# Patient Record
Sex: MALE | Race: WHITE | NOT HISPANIC OR LATINO | Employment: OTHER | URBAN - METROPOLITAN AREA
[De-identification: names, ages, dates, MRNs, and addresses within clinical notes are randomized per-mention and may not be internally consistent; named-entity substitution may affect disease eponyms.]

---

## 2017-01-13 ENCOUNTER — OFFICE VISIT (OUTPATIENT)
Dept: OTOLARYNGOLOGY | Facility: CLINIC | Age: 37
End: 2017-01-13
Payer: COMMERCIAL

## 2017-01-13 ENCOUNTER — CLINICAL SUPPORT (OUTPATIENT)
Dept: AUDIOLOGY | Facility: CLINIC | Age: 37
End: 2017-01-13
Payer: COMMERCIAL

## 2017-01-13 VITALS — WEIGHT: 247.38 LBS | BODY MASS INDEX: 30.76 KG/M2 | HEIGHT: 75 IN

## 2017-01-13 DIAGNOSIS — H91.22 SUDDEN HEARING LOSS, LEFT: Primary | ICD-10-CM

## 2017-01-13 DIAGNOSIS — H90.42 SENSORINEURAL HEARING LOSS OF LEFT EAR WITH UNRESTRICTED HEARING OF CONTRALATERAL EAR: ICD-10-CM

## 2017-01-13 PROBLEM — H90.5 SENSORINEURAL HEARING LOSS OF LEFT EAR: Status: ACTIVE | Noted: 2017-01-13

## 2017-01-13 PROCEDURE — 99999 PR PBB SHADOW E&M-EST. PATIENT-LVL II: CPT | Mod: PBBFAC,,, | Performed by: OTOLARYNGOLOGY

## 2017-01-13 PROCEDURE — 92557 COMPREHENSIVE HEARING TEST: CPT | Mod: S$GLB,,, | Performed by: AUDIOLOGIST

## 2017-01-13 PROCEDURE — 1159F MED LIST DOCD IN RCRD: CPT | Mod: S$GLB,,, | Performed by: OTOLARYNGOLOGY

## 2017-01-13 PROCEDURE — 99204 OFFICE O/P NEW MOD 45 MIN: CPT | Mod: S$GLB,,, | Performed by: OTOLARYNGOLOGY

## 2017-01-13 PROCEDURE — 92550 TYMPANOMETRY & REFLEX THRESH: CPT | Mod: S$GLB,,, | Performed by: AUDIOLOGIST
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
## 2017-01-13 NOTE — LETTER
January 13, 2017      Terell Tellez MD  [address]  [facility] [address]           [facility] - Otorhinolaryngology  [address]  [facility] [address]  Phone: [phone]  Fax: [phone]          Patient: Elliott Hernandez   MR Number: [id number]   YOB: 1980   Date of Visit: 1/13/2017       Dear Dr. Terell Tellez:    Thank you for referring Elliott Hernandez to me for evaluation. Attached you will find relevant portions of my assessment and plan of care.    If you have questions, please do not hesitate to call me. I look forward to following Elliott Hernandez along with you.    Sincerely,    Juan Presley MD    Enclosure  CC:  No Recipients    If you would like to receive this communication electronically, please contact [email or web address] or [phone] to request more information on Vid[employer] access.    For providers and/or their staff who would like to refer a patient to [facility], please contact us through our one-stop-shop provider referral line, [facility], at [phone].    If you feel you have received this communication in error or would no longer like to receive these types of communications, please e-mail [email or web address]

## 2017-01-13 NOTE — PROGRESS NOTES
Normal hearing with type A tympanogram and present reflexes in the right ear.  Normal to moderate hearing loss with type A tympanograms and present reflexes in the left ear.

## 2017-09-13 ENCOUNTER — NUTRITION (OUTPATIENT)
Dept: NUTRITION | Facility: CLINIC | Age: 37
End: 2017-09-13

## 2017-09-13 DIAGNOSIS — Z71.3 NUTRITIONAL COUNSELING: Primary | ICD-10-CM

## 2017-09-13 PROCEDURE — S9470 NUTRITIONAL COUNSELING, DIET: HCPCS | Mod: S$GLB,,, | Performed by: NUTRITIONIST

## 2017-09-13 NOTE — PROGRESS NOTES
Nutrition Assessment for Medical Nutrition Therapy    Referring professional: self for 12-week program    Reason for MNT visit: Pt in for education and nutrition counseling regarding: Healthy eating habits/weight loss     Past Medical History:  Active Ambulatory Problems     Diagnosis Date Noted    Sensorineural hearing loss of left ear 01/13/2017     Resolved Ambulatory Problems     Diagnosis Date Noted    No Resolved Ambulatory Problems     No Additional Past Medical History       Medications: none (advil prn)    InBody Body Composition stats:  Wt: 239.6#  SMM: 105.2#  %BF: 23.6  BMI: 30.7  Visceral: 9    Energy Requirements:  Calories: 2000  Protein: 191-240 grams  Carbohydrates: ~100-140 grams  Focus on Heart Healthy Fats  Fluid: ~120 oz = ~4, 32 oz bottle water + sweat loss    Recommended meal plan provided to client:  Breakfast: Up to 2 servings of carbs = 30-40 grams + at least 15 grams lean protein/heart healthy fat   1 cup cooked oatmeal +   o ¼ cup nuts  o 1 Tbsp PB stirred into oatmeal  o Iconic ready to drink protein shake    Snack: Up to 1 serving of carb = 15-20 grams + at least 15 grams lean protein/heart healthy fat   Not needed unless you are exceeding 4 hours between breakfast and lunch (see second snack for options)    Lunch: 6 oz lean protein + non-starchy veggies + 2 servings of carbs = 30-40 grams   Examples of 2 servings of carbs:  o 1 cup cooked pasta, 2/3 cup cooked rice, 1 medium baked/sweet potato, 2 regular slices pizza, 1 cup beans, 1 piece of fruit + 2 slices whole wheat bread/ ½ traci from Byblos  - The rest of your plate will consist of 6 oz lean protein + non-starchy veggies   Meal examples:   o Byblos: ½ traci + smear of hummus + 6 oz chicken/beef shawarma + side salad  o Your sushi choice is perfect  o Popeyes: Take the skin off, you are then eating grilled chicken + side of green beans + side of carb (either mashed potatoes OR Red beans and rice)  o When dining out, then at  most 1 cup cooked starchthe rest will be 6 oz lean protein + veggies  - Unless [place] [employer]: Choose 6-7 oz meat  (brisket, chicken, pork)    Snack: Up to 1.5 servings of carb = 15-30 grams + at least 15 grams lean protein/heart healthy fat   Grilled chicken sandwich or 1 hamburger from fast food guide list   Apple + 2 Tbsp PB   2 Iconic protein drinks   Carrots are fine, but can add protein (string cheese, deli meat slices, iconic, etc)   Flap Jacked mighty muffin pack (see link belowthese are found at [facility] and are very convenient)   2 servings of Beanitos (~20 chips) + salsa and/or ¼ cup shredded cheese melted on top   6 inch subway sandwich, take half the bread off   Piece of fruit + string cheese   Seaman from home: Take 2 slices 100% whole wheat bread + load the turkey/ham slices + slice of cheese + 1 Tbsp condiment of choice   1/3 cup trail mix    Dinner: NO carbs   Focus on 7 oz lean protein + non-starchy veggies  o Kabobs, stir carter, etc  o Byblos: chicken/beef shawarma + side of veggies (no hummus, no traci)  o Chipotle: Burrito in a Bowl (no rice, no beans)focus on double the protein and any non-starchy veggie you want  o Akbar: Fish + veggies OR EFN list  o Reginellis: If turkey sandwich, then take have the bread off (compromise)    Snack: (Optional): Anything UP  calories   1 light beer OR glass of wine   1 cup popcorn (not buttered)   fun size    Additional Notes:  Incorporate a source of lean protein, fiber, and heart healthy fat with each meal.   - These three nutrients take the longest to digest, so we feel full longer    Restaurant TipsPick 2 out of the 5:  1. Bread and butter/chips and salsa  2. Appetizer  3. Entrée  4. Dessert  5. Alcohol  Eating out in [place]:  1. www.eatfitnola.com  2. Fit BRANDON maged (Free maged for smartphones)  a. List of all Eat Fit restaurants  b. See what dishes are Eat Fit at each restaurant  c. See the nutrition facts for each Eat Fit  dish  d. Provides >200 Eat Fit recipes  3. Fast Food Lite Guide  Salad Fats: Pick 2, each being 2 Tbsp:  1. Dressing  2. Cheese  3. Nuts  4. Kessler  5. Avocado (1/4 okay)  6. Guacamole  7. Sour cream  8. Egg (1 okay)  -Flap Jacked might muffin: https://www.FashionQlub/collections/mighty-muffins  -Fried foods/cheat meals: For the first month do 1x/week at most. After month 1, drop down to 2x/month  -Better for you lower calorie drink options:   Fruitables   Kaylyn Sun Roarin Water   Crystal Lite PURE    Nutrition History        Food allergies  intolerances: NKFA    Dining out: Daily, 6 or more times per week    Smoking/alcohol: nonsmoker ; alcohol: beer and wine (~10 or so a week)  Beverages:   Coffee: Ice coffee with a splash of milk or almond milk (no sweeteners)  Water: Drinks water all day     Meal preparation/shopping: self        Recommendations/Interventions:  1. Aim for 7-9 hours sleep  2. Exercise 60 minutes most days  3. Eat breakfast within 1-2 hours of waking up  4. Try not to skip any meals or snacks, not going more than 3-4 hours without eating.   5. At each meal and snack, try to include a source of fiber + lean protein + healthy fat.       Comprehension: good     Motivation to change: moderate      Follow-up: 2 weeks    Counseling time: 1 Hour 30 Minutes

## 2017-09-27 ENCOUNTER — NUTRITION (OUTPATIENT)
Dept: NUTRITION | Facility: CLINIC | Age: 37
End: 2017-09-27

## 2017-09-27 DIAGNOSIS — Z71.3 NUTRITIONAL COUNSELING: Primary | ICD-10-CM

## 2017-09-27 PROCEDURE — 99499 UNLISTED E&M SERVICE: CPT | Mod: S$GLB,,, | Performed by: NUTRITIONIST

## 2017-09-27 NOTE — PROGRESS NOTES
Reason for MNT visit: %BF loss    Down about 2 pounds, gained a little muscle, and lost 0.6% body fat. He started snacking more often and bought some new products that we talked about. He had his birthday last week and was on the go a lot, so still did well with planning. Been doing okay with no carbs     The plan for vacation is to go running + spear fishing (some sort of exercise) since he will be drinking a good bit.     Recommendations: Continue following recommended meal plan    Comprehension: good     Motivation to change: moderate    Follow-up: 2.5 weeks    Counseling time: 30 Minutes

## 2017-10-19 ENCOUNTER — NUTRITION (OUTPATIENT)
Dept: NUTRITION | Facility: CLINIC | Age: 37
End: 2017-10-19

## 2017-10-19 DIAGNOSIS — Z71.3 NUTRITIONAL COUNSELING: Primary | ICD-10-CM

## 2017-10-19 PROCEDURE — 99499 UNLISTED E&M SERVICE: CPT | Mod: S$GLB,,, | Performed by: NUTRITIONIST

## 2017-10-19 NOTE — PROGRESS NOTES
Reason for MNT visit: weight loss.    Not as much exercise as he used He has been super busy so not always snacking as often as he would like to, particularly the afternoon snack around 2:30/3 pm. I recommended to make turkey/cheese or PB sandwiches ahead of time to pack with him. Also suggested beef jerky and trail mix to bring to work. Overall, he is down ~1% body fat.          Recommendations: Continue following meal plan while adding in quick/easy recommended snacks.     Comprehension: good     Motivation to change: moderate    Follow-up: 2 weeks    Counseling time: 30 Minutes

## 2017-11-02 ENCOUNTER — NUTRITION (OUTPATIENT)
Dept: NUTRITION | Facility: CLINIC | Age: 37
End: 2017-11-02

## 2017-11-02 DIAGNOSIS — Z71.3 NUTRITIONAL COUNSELING: Primary | ICD-10-CM

## 2017-11-02 PROCEDURE — 99499 UNLISTED E&M SERVICE: CPT | Mod: S$GLB,,, | Performed by: NUTRITIONIST

## 2017-11-02 NOTE — PROGRESS NOTES
Reason for MNT visit:     Wt: 241.1 lbs  SMM: 107.6 lbs  %BF: 22.6%  Visceral: 11  BMI: 30.9    The diet is okay--there are a few off days, but he keeps principles in mind. He would like to exercise more, but finding time is an issue. I recommended even doing a 10 minute run adds up. The goal now is to maintain/slowly lose %BF instead of putting a number to it. He is doing 1% BF in about 2 months, but we are both happy with this since he travels so mucha and has a lot of social events    Recommendations: Recommended to increase exercise on days where he knows he may drink/eat more.     Comprehension: good     Motivation to change: moderate    Follow-up: 2 weeks    Counseling time: 30 Minutes

## 2022-12-28 ENCOUNTER — TELEPHONE (OUTPATIENT)
Dept: RHEUMATOLOGY | Facility: CLINIC | Age: 42
End: 2022-12-28
Payer: COMMERCIAL

## 2022-12-28 ENCOUNTER — TELEPHONE (OUTPATIENT)
Dept: ORTHOPEDICS | Facility: CLINIC | Age: 42
End: 2022-12-28
Payer: COMMERCIAL

## 2022-12-28 DIAGNOSIS — R52 PAIN: Primary | ICD-10-CM

## 2022-12-28 NOTE — TELEPHONE ENCOUNTER
Spoke with the patient and let him know that we did not have any sooner appointments but to reach out to his podiatrist to order labs for his up coming appointment

## 2023-01-03 ENCOUNTER — OFFICE VISIT (OUTPATIENT)
Dept: RHEUMATOLOGY | Facility: CLINIC | Age: 43
End: 2023-01-03
Payer: COMMERCIAL

## 2023-01-03 VITALS
DIASTOLIC BLOOD PRESSURE: 84 MMHG | HEIGHT: 75 IN | HEART RATE: 74 BPM | WEIGHT: 248.25 LBS | BODY MASS INDEX: 30.87 KG/M2 | SYSTOLIC BLOOD PRESSURE: 122 MMHG

## 2023-01-03 DIAGNOSIS — M10.9 GOUTY ARTHRITIS: ICD-10-CM

## 2023-01-03 DIAGNOSIS — M25.50 POLYARTHRALGIA: Primary | ICD-10-CM

## 2023-01-03 DIAGNOSIS — R29.898 HAND WEAKNESS: ICD-10-CM

## 2023-01-03 PROCEDURE — 3008F PR BODY MASS INDEX (BMI) DOCUMENTED: ICD-10-PCS | Mod: CPTII,S$GLB,, | Performed by: INTERNAL MEDICINE

## 2023-01-03 PROCEDURE — 99205 PR OFFICE/OUTPT VISIT, NEW, LEVL V, 60-74 MIN: ICD-10-PCS | Mod: S$GLB,,, | Performed by: INTERNAL MEDICINE

## 2023-01-03 PROCEDURE — 99999 PR PBB SHADOW E&M-EST. PATIENT-LVL III: CPT | Mod: PBBFAC,,, | Performed by: INTERNAL MEDICINE

## 2023-01-03 PROCEDURE — 3074F SYST BP LT 130 MM HG: CPT | Mod: CPTII,S$GLB,, | Performed by: INTERNAL MEDICINE

## 2023-01-03 PROCEDURE — 99205 OFFICE O/P NEW HI 60 MIN: CPT | Mod: S$GLB,,, | Performed by: INTERNAL MEDICINE

## 2023-01-03 PROCEDURE — 3008F BODY MASS INDEX DOCD: CPT | Mod: CPTII,S$GLB,, | Performed by: INTERNAL MEDICINE

## 2023-01-03 PROCEDURE — 3079F PR MOST RECENT DIASTOLIC BLOOD PRESSURE 80-89 MM HG: ICD-10-PCS | Mod: CPTII,S$GLB,, | Performed by: INTERNAL MEDICINE

## 2023-01-03 PROCEDURE — 3079F DIAST BP 80-89 MM HG: CPT | Mod: CPTII,S$GLB,, | Performed by: INTERNAL MEDICINE

## 2023-01-03 PROCEDURE — 1159F PR MEDICATION LIST DOCUMENTED IN MEDICAL RECORD: ICD-10-PCS | Mod: CPTII,S$GLB,, | Performed by: INTERNAL MEDICINE

## 2023-01-03 PROCEDURE — 3074F PR MOST RECENT SYSTOLIC BLOOD PRESSURE < 130 MM HG: ICD-10-PCS | Mod: CPTII,S$GLB,, | Performed by: INTERNAL MEDICINE

## 2023-01-03 PROCEDURE — 1159F MED LIST DOCD IN RCRD: CPT | Mod: CPTII,S$GLB,, | Performed by: INTERNAL MEDICINE

## 2023-01-03 PROCEDURE — 99999 PR PBB SHADOW E&M-EST. PATIENT-LVL III: ICD-10-PCS | Mod: PBBFAC,,, | Performed by: INTERNAL MEDICINE

## 2023-01-03 RX ORDER — COLCHICINE 0.6 MG/1
0.6 TABLET ORAL DAILY
Qty: 30 TABLET | Refills: 11 | Status: SHIPPED | OUTPATIENT
Start: 2023-01-03 | End: 2023-02-27

## 2023-01-03 RX ORDER — ALLOPURINOL 100 MG/1
200 TABLET ORAL DAILY
Qty: 60 TABLET | Refills: 0 | Status: SHIPPED | OUTPATIENT
Start: 2023-01-03 | End: 2023-02-07

## 2023-01-03 RX ORDER — HYDROCODONE BITARTRATE AND ACETAMINOPHEN 7.5; 325 MG/1; MG/1
TABLET ORAL
COMMUNITY
End: 2023-02-27

## 2023-01-03 RX ORDER — PREDNISONE 5 MG/1
TABLET, DELAYED RELEASE ORAL
COMMUNITY
End: 2023-02-27

## 2023-01-03 NOTE — PROGRESS NOTES
Rapid3 Question Responses and Scores 1/2/2023   MDHAQ Score 1.3   Psychologic Score 1.1   Pain Score 7.5   When you awakened in the morning OVER THE LAST WEEK, did you feel stiff? Yes   If Yes, please indicate the number of hours until you are as limber as you will be for the day 3   Fatigue Score 5.5   Global Health Score 2.5   RAPID3 Score 4.78     Answers submitted by the patient for this visit:  Rheumatology Questionnaire (Submitted on 1/2/2023)  fever: No  eye redness: No  mouth sores: No  headaches: No  shortness of breath: No  chest pain: No  trouble swallowing: No  diarrhea: No  constipation: No  unexpected weight change: No  genital sore: No  During the last 3 days, have you had a skin rash?: No  Bruises or bleeds easily: No  cough: No

## 2023-01-03 NOTE — PROGRESS NOTES
"Subjective:       Patient ID: Elliott Hernandez is a 42 y.o. male.    Chief Complaint: Disease Management   HPI: 42 year old M  with presumed gout here to establish care. In 2018, he had episode of pain and swelling in left ankle.  He had steroid injection and it helped.  In 2019 and 2020,  it affected his left ankle. Most recently, he had pain and swelling in left knee. He had trouble walking or bending his knee.  He took anti inflammatory.    He reports medrol normally works him better.  Denies any rashes, oral ulcers,photosensitivity, or raynauds.   He drinks on occasion during vacations.  Family hx:  negative for psoriasis; gout: diet       Review of Systems   Constitutional:  Negative for activity change, appetite change, chills, diaphoresis, fatigue, fever and unexpected weight change.   HENT:  Negative for ear discharge, ear pain, hearing loss, mouth sores, nosebleeds, sinus pressure and trouble swallowing.    Eyes: Negative.  Negative for photophobia, pain, discharge, redness and itching.   Respiratory:  Negative for cough, chest tightness and shortness of breath.    Cardiovascular:  Negative for chest pain, palpitations and leg swelling.   Gastrointestinal:  Negative for abdominal distention, blood in stool, constipation, diarrhea and nausea.   Endocrine: Negative for cold intolerance, heat intolerance, polydipsia and polyphagia.   Genitourinary:  Negative for flank pain, genital sores and hematuria.   Musculoskeletal:  Positive for arthralgias and joint swelling. Negative for back pain, gait problem and neck pain.   Skin:  Negative for color change, pallor and rash.   Neurological:  Negative for dizziness, weakness, light-headedness and headaches.   Hematological:  Negative for adenopathy. Does not bruise/bleed easily.   Psychiatric/Behavioral:  Negative for decreased concentration and hallucinations. The patient is not nervous/anxious.          Objective:   /84   Pulse 74   Ht 6' 3" (1.905 " m)   Wt 112.6 kg (248 lb 3.8 oz)   BMI 31.03 kg/m²      Physical Exam   Constitutional: normal appearance.   HENT:   Head: Normocephalic and atraumatic.   Nose: Nose normal. No rhinorrhea or nasal congestion.   Mouth/Throat: Mucous membranes are dry. Oropharynx is clear.   Eyes: Pupils are equal, round, and reactive to light. Conjunctivae are normal. Right eye exhibits no discharge. Left eye exhibits no discharge. No scleral icterus.   Cardiovascular: Normal rate and regular rhythm. Exam reveals no gallop and no friction rub.   No murmur heard.  Pulmonary/Chest: No stridor. No respiratory distress. He has no wheezes.   Abdominal: Bowel sounds are normal. He exhibits no distension and no mass. There is no abdominal tenderness.   Musculoskeletal:         General: Tenderness present. Normal range of motion.   Neurological: He is alert.   Skin: No bruising noted. No jaundice or pallor.   Mild tenderness in left knee with extension      No data to display     Assessment:     42 year old M  with presumed gout here to establish care. He has episodes of left ankle swelling and left knee swelling.  He reports flares are usually in setting if eating meat and also alcohol.  He reports also weakness in his hands so will refer him to orthopedics.    No diagnosis found.        Plan:       Problem List Items Addressed This Visit    None    Start allopurinol 200mg po qday  Start colchicine 0.6 mg po qd ay  ( He will be going on vacation next week so he will start allopurinol when he gets back in town).  Labs and xrays in 4 weeks after he starts the allopurinol        60 * minutes of total time spent on the encounter, which includes face to face time and non-face to face time preparing to see the patient (eg, review of tests), Obtaining and/or reviewing separately obtained history, Documenting clinical information in the electronic or other health record, Independently interpreting results (not separately reported) and  communicating results to the patient/family/caregiver, or Care coordination (not separately reported).

## 2023-01-13 DIAGNOSIS — M79.642 BILATERAL HAND PAIN: Primary | ICD-10-CM

## 2023-01-13 DIAGNOSIS — M79.641 BILATERAL HAND PAIN: Primary | ICD-10-CM

## 2023-01-18 ENCOUNTER — TELEPHONE (OUTPATIENT)
Dept: ORTHOPEDICS | Facility: CLINIC | Age: 43
End: 2023-01-18
Payer: COMMERCIAL

## 2023-01-24 ENCOUNTER — HOSPITAL ENCOUNTER (OUTPATIENT)
Dept: RADIOLOGY | Facility: OTHER | Age: 43
Discharge: HOME | End: 2023-01-24
Attending: ORTHOPAEDIC SURGERY
Payer: COMMERCIAL

## 2023-01-24 ENCOUNTER — OFFICE VISIT (OUTPATIENT)
Dept: ORTHOPEDICS | Facility: CLINIC | Age: 43
End: 2023-01-24
Payer: COMMERCIAL

## 2023-01-24 VITALS
SYSTOLIC BLOOD PRESSURE: 126 MMHG | HEIGHT: 75 IN | BODY MASS INDEX: 30.84 KG/M2 | DIASTOLIC BLOOD PRESSURE: 85 MMHG | WEIGHT: 248 LBS

## 2023-01-24 DIAGNOSIS — R29.898 HAND WEAKNESS: ICD-10-CM

## 2023-01-24 DIAGNOSIS — M79.642 BILATERAL HAND PAIN: ICD-10-CM

## 2023-01-24 DIAGNOSIS — M79.641 BILATERAL HAND PAIN: ICD-10-CM

## 2023-01-24 PROCEDURE — 73130 XR HAND COMPLETE 3 VIEWS BILATERAL: ICD-10-PCS | Mod: 26,,, | Performed by: RADIOLOGY

## 2023-01-24 PROCEDURE — 3008F BODY MASS INDEX DOCD: CPT | Mod: CPTII,S$GLB,, | Performed by: ORTHOPAEDIC SURGERY

## 2023-01-24 PROCEDURE — 1159F PR MEDICATION LIST DOCUMENTED IN MEDICAL RECORD: ICD-10-PCS | Mod: CPTII,S$GLB,, | Performed by: ORTHOPAEDIC SURGERY

## 2023-01-24 PROCEDURE — 3008F PR BODY MASS INDEX (BMI) DOCUMENTED: ICD-10-PCS | Mod: CPTII,S$GLB,, | Performed by: ORTHOPAEDIC SURGERY

## 2023-01-24 PROCEDURE — 99204 OFFICE O/P NEW MOD 45 MIN: CPT | Mod: S$GLB,,, | Performed by: ORTHOPAEDIC SURGERY

## 2023-01-24 PROCEDURE — 3079F PR MOST RECENT DIASTOLIC BLOOD PRESSURE 80-89 MM HG: ICD-10-PCS | Mod: CPTII,S$GLB,, | Performed by: ORTHOPAEDIC SURGERY

## 2023-01-24 PROCEDURE — 99999 PR PBB SHADOW E&M-EST. PATIENT-LVL III: CPT | Mod: PBBFAC,,, | Performed by: ORTHOPAEDIC SURGERY

## 2023-01-24 PROCEDURE — 99999 PR PBB SHADOW E&M-EST. PATIENT-LVL III: ICD-10-PCS | Mod: PBBFAC,,, | Performed by: ORTHOPAEDIC SURGERY

## 2023-01-24 PROCEDURE — 1159F MED LIST DOCD IN RCRD: CPT | Mod: CPTII,S$GLB,, | Performed by: ORTHOPAEDIC SURGERY

## 2023-01-24 PROCEDURE — 3074F SYST BP LT 130 MM HG: CPT | Mod: CPTII,S$GLB,, | Performed by: ORTHOPAEDIC SURGERY

## 2023-01-24 PROCEDURE — 73130 X-RAY EXAM OF HAND: CPT | Mod: TC,50,FY

## 2023-01-24 PROCEDURE — 99204 PR OFFICE/OUTPT VISIT, NEW, LEVL IV, 45-59 MIN: ICD-10-PCS | Mod: S$GLB,,, | Performed by: ORTHOPAEDIC SURGERY

## 2023-01-24 PROCEDURE — 3074F PR MOST RECENT SYSTOLIC BLOOD PRESSURE < 130 MM HG: ICD-10-PCS | Mod: CPTII,S$GLB,, | Performed by: ORTHOPAEDIC SURGERY

## 2023-01-24 PROCEDURE — 73130 X-RAY EXAM OF HAND: CPT | Mod: 26,,, | Performed by: RADIOLOGY

## 2023-01-24 PROCEDURE — 3079F DIAST BP 80-89 MM HG: CPT | Mod: CPTII,S$GLB,, | Performed by: ORTHOPAEDIC SURGERY

## 2023-01-24 NOTE — PROGRESS NOTES
"Subjective:      Elliott Hernandez is a 42 y.o. male who presents for evaluation of bilateral hand weakness Pt is a  and cannot do a lot of things that he was used to do. Turning wrenches, if he  a wrench or turn a screwdriver he cannot do it the same anymore. If he is typing too long he has to take breaks.    He has discomfort  but no real pain.   No hand therapy  Pt is getting worse with wekaness, again it is just discomfort.  He also sees a chiropractor for his back, no N?T    Pt sees a rheumatologist for Gout      Review of Systems  Pertinent items are noted in HPI.      Objective:      /85   Ht 6' 3" (1.905 m)   Wt 112.5 kg (248 lb)   BMI 31.00 kg/m²   Right hand:  normal exam, no swelling, tenderness, instability; ligaments intact, full ROM both hands, wrists, and finger joints   Left hand:  normal exam, no swelling, tenderness, instability; ligaments intact, full ROM both hands, wrists, and finger joints   Negative for carpal tunnel examination negative for swelling erythema no muscle atrophy noted   Imaging:  X-ray both hands: no fracture, dislocation, swelling or degenerative changes noted      Assessment:      Hand weakness      Plan:      Natural history and expected course discussed. Questions answered.  I ordered 1 time visit for hand therapy I would like to get repetitive pinch and  strength to see if patient fatigues faster than what is considered normal for an adult male I would like to baseline strength as well as range of motion measurements     "

## 2023-02-17 ENCOUNTER — LAB VISIT (OUTPATIENT)
Dept: LAB | Facility: HOSPITAL | Age: 43
End: 2023-02-17
Attending: INTERNAL MEDICINE
Payer: COMMERCIAL

## 2023-02-17 DIAGNOSIS — M25.50 POLYARTHRALGIA: ICD-10-CM

## 2023-02-17 LAB
ALBUMIN SERPL BCP-MCNC: 4.2 G/DL (ref 3.5–5.2)
ALBUMIN SERPL BCP-MCNC: 4.2 G/DL (ref 3.5–5.2)
ALP SERPL-CCNC: 55 U/L (ref 55–135)
ALP SERPL-CCNC: 55 U/L (ref 55–135)
ALT SERPL W/O P-5'-P-CCNC: 48 U/L (ref 10–44)
ALT SERPL W/O P-5'-P-CCNC: 48 U/L (ref 10–44)
ANION GAP SERPL CALC-SCNC: 11 MMOL/L (ref 8–16)
ANION GAP SERPL CALC-SCNC: 11 MMOL/L (ref 8–16)
AST SERPL-CCNC: 31 U/L (ref 10–40)
AST SERPL-CCNC: 31 U/L (ref 10–40)
BASOPHILS # BLD AUTO: 0.05 K/UL (ref 0–0.2)
BASOPHILS NFR BLD: 0.9 % (ref 0–1.9)
BILIRUB SERPL-MCNC: 0.6 MG/DL (ref 0.1–1)
BILIRUB SERPL-MCNC: 0.6 MG/DL (ref 0.1–1)
BUN SERPL-MCNC: 14 MG/DL (ref 6–20)
BUN SERPL-MCNC: 14 MG/DL (ref 6–20)
CALCIUM SERPL-MCNC: 9.3 MG/DL (ref 8.7–10.5)
CALCIUM SERPL-MCNC: 9.3 MG/DL (ref 8.7–10.5)
CCP AB SER IA-ACNC: 5.5 U/ML
CHLORIDE SERPL-SCNC: 105 MMOL/L (ref 95–110)
CHLORIDE SERPL-SCNC: 105 MMOL/L (ref 95–110)
CO2 SERPL-SCNC: 23 MMOL/L (ref 23–29)
CO2 SERPL-SCNC: 23 MMOL/L (ref 23–29)
CREAT SERPL-MCNC: 1.1 MG/DL (ref 0.5–1.4)
CREAT SERPL-MCNC: 1.1 MG/DL (ref 0.5–1.4)
CRP SERPL-MCNC: 1.5 MG/L (ref 0–8.2)
DIFFERENTIAL METHOD: NORMAL
EOSINOPHIL # BLD AUTO: 0 K/UL (ref 0–0.5)
EOSINOPHIL NFR BLD: 0.7 % (ref 0–8)
ERYTHROCYTE [DISTWIDTH] IN BLOOD BY AUTOMATED COUNT: 13.4 % (ref 11.5–14.5)
EST. GFR  (NO RACE VARIABLE): >60 ML/MIN/1.73 M^2
EST. GFR  (NO RACE VARIABLE): >60 ML/MIN/1.73 M^2
GLUCOSE SERPL-MCNC: 120 MG/DL (ref 70–110)
GLUCOSE SERPL-MCNC: 120 MG/DL (ref 70–110)
HBV CORE AB SERPL QL IA: NORMAL
HBV SURFACE AG SERPL QL IA: NORMAL
HCT VFR BLD AUTO: 49.1 % (ref 40–54)
HCV AB SERPL QL IA: NORMAL
HGB BLD-MCNC: 16.2 G/DL (ref 14–18)
IMM GRANULOCYTES # BLD AUTO: 0.01 K/UL (ref 0–0.04)
IMM GRANULOCYTES NFR BLD AUTO: 0.2 % (ref 0–0.5)
LYMPHOCYTES # BLD AUTO: 1.5 K/UL (ref 1–4.8)
LYMPHOCYTES NFR BLD: 27.4 % (ref 18–48)
MCH RBC QN AUTO: 28.6 PG (ref 27–31)
MCHC RBC AUTO-ENTMCNC: 33 G/DL (ref 32–36)
MCV RBC AUTO: 87 FL (ref 82–98)
MONOCYTES # BLD AUTO: 0.4 K/UL (ref 0.3–1)
MONOCYTES NFR BLD: 7.7 % (ref 4–15)
NEUTROPHILS # BLD AUTO: 3.4 K/UL (ref 1.8–7.7)
NEUTROPHILS NFR BLD: 63.1 % (ref 38–73)
NRBC BLD-RTO: 0 /100 WBC
PLATELET # BLD AUTO: 301 K/UL (ref 150–450)
PMV BLD AUTO: 10.2 FL (ref 9.2–12.9)
POTASSIUM SERPL-SCNC: 3.9 MMOL/L (ref 3.5–5.1)
POTASSIUM SERPL-SCNC: 3.9 MMOL/L (ref 3.5–5.1)
PROT SERPL-MCNC: 7.4 G/DL (ref 6–8.4)
PROT SERPL-MCNC: 7.4 G/DL (ref 6–8.4)
RBC # BLD AUTO: 5.66 M/UL (ref 4.6–6.2)
RHEUMATOID FACT SERPL-ACNC: <13 IU/ML (ref 0–15)
SODIUM SERPL-SCNC: 139 MMOL/L (ref 136–145)
SODIUM SERPL-SCNC: 139 MMOL/L (ref 136–145)
URATE SERPL-MCNC: 6.4 MG/DL (ref 3.4–7)
WBC # BLD AUTO: 5.43 K/UL (ref 3.9–12.7)

## 2023-02-17 PROCEDURE — 86704 HEP B CORE ANTIBODY TOTAL: CPT | Performed by: INTERNAL MEDICINE

## 2023-02-17 PROCEDURE — 86200 CCP ANTIBODY: CPT | Performed by: INTERNAL MEDICINE

## 2023-02-17 PROCEDURE — 84550 ASSAY OF BLOOD/URIC ACID: CPT | Performed by: INTERNAL MEDICINE

## 2023-02-17 PROCEDURE — 86038 ANTINUCLEAR ANTIBODIES: CPT | Performed by: INTERNAL MEDICINE

## 2023-02-17 PROCEDURE — 80053 COMPREHEN METABOLIC PANEL: CPT | Performed by: INTERNAL MEDICINE

## 2023-02-17 PROCEDURE — 86140 C-REACTIVE PROTEIN: CPT | Performed by: INTERNAL MEDICINE

## 2023-02-17 PROCEDURE — 36415 COLL VENOUS BLD VENIPUNCTURE: CPT | Mod: PO | Performed by: INTERNAL MEDICINE

## 2023-02-17 PROCEDURE — 86803 HEPATITIS C AB TEST: CPT | Performed by: INTERNAL MEDICINE

## 2023-02-17 PROCEDURE — 87340 HEPATITIS B SURFACE AG IA: CPT | Performed by: INTERNAL MEDICINE

## 2023-02-17 PROCEDURE — 85025 COMPLETE CBC W/AUTO DIFF WBC: CPT | Performed by: INTERNAL MEDICINE

## 2023-02-17 PROCEDURE — 85652 RBC SED RATE AUTOMATED: CPT | Performed by: INTERNAL MEDICINE

## 2023-02-17 PROCEDURE — 86431 RHEUMATOID FACTOR QUANT: CPT | Performed by: INTERNAL MEDICINE

## 2023-02-18 LAB — ERYTHROCYTE [SEDIMENTATION RATE] IN BLOOD BY PHOTOMETRIC METHOD: 10 MM/HR (ref 0–23)

## 2023-02-20 LAB — ANA SER QL IF: NORMAL

## 2023-02-25 ENCOUNTER — HOSPITAL ENCOUNTER (OUTPATIENT)
Dept: RADIOLOGY | Facility: HOSPITAL | Age: 43
Discharge: HOME | End: 2023-02-25
Attending: INTERNAL MEDICINE
Payer: COMMERCIAL

## 2023-02-25 DIAGNOSIS — M25.50 POLYARTHRALGIA: ICD-10-CM

## 2023-02-25 PROCEDURE — 77077 XR ARTHRITIS SURVEY: ICD-10-PCS | Mod: 26,,, | Performed by: RADIOLOGY

## 2023-02-25 PROCEDURE — 77077 JOINT SURVEY SINGLE VIEW: CPT | Mod: 26,,, | Performed by: RADIOLOGY

## 2023-02-25 PROCEDURE — 77077 JOINT SURVEY SINGLE VIEW: CPT | Mod: TC,PO

## 2023-02-27 ENCOUNTER — OFFICE VISIT (OUTPATIENT)
Dept: ENDOCRINOLOGY | Facility: CLINIC | Age: 43
End: 2023-02-27
Payer: COMMERCIAL

## 2023-02-27 VITALS — BODY MASS INDEX: 30.89 KG/M2 | HEIGHT: 75 IN | WEIGHT: 248.44 LBS

## 2023-02-27 DIAGNOSIS — R53.82 CHRONIC FATIGUE: ICD-10-CM

## 2023-02-27 DIAGNOSIS — G47.19 EXCESSIVE DAYTIME SLEEPINESS: ICD-10-CM

## 2023-02-27 PROCEDURE — 3008F BODY MASS INDEX DOCD: CPT | Mod: CPTII,S$GLB,, | Performed by: INTERNAL MEDICINE

## 2023-02-27 PROCEDURE — 99999 PR PBB SHADOW E&M-EST. PATIENT-LVL III: CPT | Mod: PBBFAC,,, | Performed by: INTERNAL MEDICINE

## 2023-02-27 PROCEDURE — 99204 PR OFFICE/OUTPT VISIT, NEW, LEVL IV, 45-59 MIN: ICD-10-PCS | Mod: S$GLB,,, | Performed by: INTERNAL MEDICINE

## 2023-02-27 PROCEDURE — 3008F PR BODY MASS INDEX (BMI) DOCUMENTED: ICD-10-PCS | Mod: CPTII,S$GLB,, | Performed by: INTERNAL MEDICINE

## 2023-02-27 PROCEDURE — 99999 PR PBB SHADOW E&M-EST. PATIENT-LVL III: ICD-10-PCS | Mod: PBBFAC,,, | Performed by: INTERNAL MEDICINE

## 2023-02-27 PROCEDURE — 1159F PR MEDICATION LIST DOCUMENTED IN MEDICAL RECORD: ICD-10-PCS | Mod: CPTII,S$GLB,, | Performed by: INTERNAL MEDICINE

## 2023-02-27 PROCEDURE — 1160F RVW MEDS BY RX/DR IN RCRD: CPT | Mod: CPTII,S$GLB,, | Performed by: INTERNAL MEDICINE

## 2023-02-27 PROCEDURE — 1159F MED LIST DOCD IN RCRD: CPT | Mod: CPTII,S$GLB,, | Performed by: INTERNAL MEDICINE

## 2023-02-27 PROCEDURE — 99204 OFFICE O/P NEW MOD 45 MIN: CPT | Mod: S$GLB,,, | Performed by: INTERNAL MEDICINE

## 2023-02-27 PROCEDURE — 1160F PR REVIEW ALL MEDS BY PRESCRIBER/CLIN PHARMACIST DOCUMENTED: ICD-10-PCS | Mod: CPTII,S$GLB,, | Performed by: INTERNAL MEDICINE

## 2023-02-27 NOTE — ASSESSMENT & PLAN NOTE
--Patient snores and reports excessive daytime sleepiness  --Underwood scale was 13 today  --Suspect he likely has CHUCKY  --Also with polycythemia on previous labs that may be secondary to CHUCKY  --Will refer to sleep medicine

## 2023-02-27 NOTE — ASSESSMENT & PLAN NOTE
--Has noticed overt fatigue more recently and some decrease in libido  --I suspect the first abnormal testosterone level he had was due to being drawn in the afternoon  --Total T done by LCMS at 8 AM was in the mid normal range  --Will check 8AM T panel and if normal he will not need additional evaluation for low testosterone

## 2023-02-27 NOTE — PROGRESS NOTES
"Subjective:      Patient ID: Elliott Hernandez is a 42 y.o. male.    Chief Complaint:  Hypogonadism      History of Present Illness  Mr presents for evaluation of male hypogonadism.    Has had overt fatigue and tiredness recently. Also reports decreased sex drive. Feels "foggy" when awakening in the morning. Had labs done in 12/2022 that showed low testosterone but drawn in afternoon. Had second level drawn at 8 AM which was normal.     Has active history of gout.     Developmental history:  Normal testis descent. Reports he was late lexi.     No family history of hypogonadism.  Normal sense of smell.    Denies recent severe/critical illness, no STD's, no orchitis or hx of orchiectomy, no radiation exposure.    Denies chronic steroid or opiate use. Denies use of marijuana. Denies use of lavender or tea tree oil. Denies use of anabolic steroids. Took supplements from [facility] 15-20 years ago. Does not drink an excessive amount of EtOH.    Mood normal. Denies excessive daytime sleepiness. He does snore. Used to take naps during the day which helped but unable to do that now due to no time with work. Beccaria sleepiness scale score of 13.     Libido is decreased. Has some difficulty getting and maintaining erections.  Gets early AM spontaneous erections. No decrease in shaving frequency. No height loss or history of fractures. Denies hot flashes or night sweats.     Has 13 year old daughter.     Denies breast tenderness or gynecomastia. Denies headaches or blurry vision. No nausea/vomiting, weight loss or dizziness/lightheadedness. Had a few concussions while younger while playing sports.     Reviewed outside labs:  12/21/2022 drawn at 2:21 PM: Hct 52.2, total testosterone 152, SHBG 23 (nml), estradiol <5.0  01/23/2023 drawn at 8:36 AM: EPO 5.4, PSA 1.2, total testosterone 515, FSH 16.5    Review of Systems   Constitutional:  Negative for chills and fever.   Gastrointestinal:  Negative for nausea and vomiting. "     Objective:   Physical Exam  Vitals and nursing note reviewed.     BP Readings from Last 3 Encounters:   01/24/23 126/85   01/03/23 122/84   09/12/16 (!) 143/88     Wt Readings from Last 1 Encounters:   02/27/23 1106 112.7 kg (248 lb 7.3 oz)       Body mass index is 31.06 kg/m².    Lab Review:   No results found for: HGBA1C  No results found for: CHOL, HDL, LDLCALC, TRIG, CHOLHDL  Lab Results   Component Value Date     02/17/2023     02/17/2023    K 3.9 02/17/2023    K 3.9 02/17/2023     02/17/2023     02/17/2023    CO2 23 02/17/2023    CO2 23 02/17/2023     (H) 02/17/2023     (H) 02/17/2023    BUN 14 02/17/2023    BUN 14 02/17/2023    CREATININE 1.1 02/17/2023    CREATININE 1.1 02/17/2023    CALCIUM 9.3 02/17/2023    CALCIUM 9.3 02/17/2023    PROT 7.4 02/17/2023    PROT 7.4 02/17/2023    ALBUMIN 4.2 02/17/2023    ALBUMIN 4.2 02/17/2023    BILITOT 0.6 02/17/2023    BILITOT 0.6 02/17/2023    ALKPHOS 55 02/17/2023    ALKPHOS 55 02/17/2023    AST 31 02/17/2023    AST 31 02/17/2023    ALT 48 (H) 02/17/2023    ALT 48 (H) 02/17/2023    ANIONGAP 11 02/17/2023    ANIONGAP 11 02/17/2023         Assessment and Plan     Excessive daytime sleepiness  --Patient snores and reports excessive daytime sleepiness  --Neopit scale was 13 today  --Suspect he likely has CHUCKY  --Also with polycythemia on previous labs that may be secondary to CHUCKY  --Will refer to sleep medicine      Chronic fatigue  --Has noticed overt fatigue more recently and some decrease in libido  --I suspect the first abnormal testosterone level he had was due to being drawn in the afternoon  --Total T done by LCMS at 8 AM was in the mid normal range  --Will check 8AM T panel and if normal he will not need additional evaluation for low testosterone      Needs 8 AM testosterone panel, A1c, LH, and FSH when able at [facility], needs referral to sleep medicine      Timbo Vargas M.D. Staff Endocrinology

## 2023-03-03 ENCOUNTER — PATIENT MESSAGE (OUTPATIENT)
Dept: RHEUMATOLOGY | Facility: CLINIC | Age: 43
End: 2023-03-03
Payer: COMMERCIAL

## 2023-03-03 DIAGNOSIS — M25.50 POLYARTHRALGIA: Primary | ICD-10-CM

## 2023-03-06 ENCOUNTER — LAB VISIT (OUTPATIENT)
Dept: LAB | Facility: HOSPITAL | Age: 43
End: 2023-03-06
Attending: INTERNAL MEDICINE
Payer: COMMERCIAL

## 2023-03-06 DIAGNOSIS — M25.50 POLYARTHRALGIA: ICD-10-CM

## 2023-03-06 DIAGNOSIS — R53.82 CHRONIC FATIGUE: ICD-10-CM

## 2023-03-06 DIAGNOSIS — G47.19 EXCESSIVE DAYTIME SLEEPINESS: ICD-10-CM

## 2023-03-06 LAB
CRP SERPL-MCNC: 1.3 MG/L (ref 0–8.2)
ERYTHROCYTE [SEDIMENTATION RATE] IN BLOOD BY PHOTOMETRIC METHOD: 3 MM/HR (ref 0–23)
ESTIMATED AVG GLUCOSE: 105 MG/DL (ref 68–131)
FSH SERPL-ACNC: 19.47 MIU/ML (ref 0.95–11.95)
HBA1C MFR BLD: 5.3 % (ref 4–5.6)
LH SERPL-ACNC: 6.9 MIU/ML (ref 0.6–12.1)
URATE SERPL-MCNC: 6.7 MG/DL (ref 3.4–7)

## 2023-03-06 PROCEDURE — 83002 ASSAY OF GONADOTROPIN (LH): CPT | Performed by: INTERNAL MEDICINE

## 2023-03-06 PROCEDURE — 86140 C-REACTIVE PROTEIN: CPT | Performed by: INTERNAL MEDICINE

## 2023-03-06 PROCEDURE — 85652 RBC SED RATE AUTOMATED: CPT | Performed by: INTERNAL MEDICINE

## 2023-03-06 PROCEDURE — 83001 ASSAY OF GONADOTROPIN (FSH): CPT | Performed by: INTERNAL MEDICINE

## 2023-03-06 PROCEDURE — 84270 ASSAY OF SEX HORMONE GLOBUL: CPT | Performed by: INTERNAL MEDICINE

## 2023-03-06 PROCEDURE — 84403 ASSAY OF TOTAL TESTOSTERONE: CPT | Performed by: INTERNAL MEDICINE

## 2023-03-06 PROCEDURE — 84550 ASSAY OF BLOOD/URIC ACID: CPT | Performed by: INTERNAL MEDICINE

## 2023-03-06 PROCEDURE — 83036 HEMOGLOBIN GLYCOSYLATED A1C: CPT | Performed by: INTERNAL MEDICINE

## 2023-03-08 RX ORDER — ALLOPURINOL 300 MG/1
300 TABLET ORAL DAILY
Qty: 30 TABLET | Refills: 6 | Status: SHIPPED | OUTPATIENT
Start: 2023-03-08 | End: 2023-08-01 | Stop reason: SDUPTHER

## 2023-03-10 ENCOUNTER — PATIENT MESSAGE (OUTPATIENT)
Dept: RHEUMATOLOGY | Facility: CLINIC | Age: 43
End: 2023-03-10
Payer: COMMERCIAL

## 2023-03-10 ENCOUNTER — PATIENT MESSAGE (OUTPATIENT)
Dept: ENDOCRINOLOGY | Facility: CLINIC | Age: 43
End: 2023-03-10
Payer: COMMERCIAL

## 2023-03-13 ENCOUNTER — OFFICE VISIT (OUTPATIENT)
Dept: SLEEP MEDICINE | Facility: CLINIC | Age: 43
End: 2023-03-13
Payer: COMMERCIAL

## 2023-03-13 ENCOUNTER — PATIENT MESSAGE (OUTPATIENT)
Dept: ENDOCRINOLOGY | Facility: CLINIC | Age: 43
End: 2023-03-13
Payer: COMMERCIAL

## 2023-03-13 VITALS
BODY MASS INDEX: 31.6 KG/M2 | WEIGHT: 252.81 LBS | SYSTOLIC BLOOD PRESSURE: 143 MMHG | HEART RATE: 83 BPM | DIASTOLIC BLOOD PRESSURE: 86 MMHG

## 2023-03-13 DIAGNOSIS — G47.30 SLEEP APNEA, UNSPECIFIED TYPE: Primary | ICD-10-CM

## 2023-03-13 DIAGNOSIS — G47.19 EXCESSIVE DAYTIME SLEEPINESS: ICD-10-CM

## 2023-03-13 LAB
ALBUMIN SERPL-MCNC: 4.5 G/DL (ref 3.6–5.1)
SHBG SERPL-SCNC: 27 NMOL/L (ref 10–50)
TESTOST FREE SERPL-MCNC: 88.9 PG/ML (ref 46–224)
TESTOST SERPL-MCNC: 550 NG/DL (ref 250–1100)
TESTOSTERONE.FREE+WB SERPL-MCNC: 182.9 NG/DL (ref 110–575)

## 2023-03-13 PROCEDURE — 3077F SYST BP >= 140 MM HG: CPT | Mod: CPTII,S$GLB,, | Performed by: PSYCHIATRY & NEUROLOGY

## 2023-03-13 PROCEDURE — 99999 PR PBB SHADOW E&M-EST. PATIENT-LVL III: CPT | Mod: PBBFAC,,, | Performed by: PSYCHIATRY & NEUROLOGY

## 2023-03-13 PROCEDURE — 99204 OFFICE O/P NEW MOD 45 MIN: CPT | Mod: S$GLB,,, | Performed by: PSYCHIATRY & NEUROLOGY

## 2023-03-13 PROCEDURE — 3008F PR BODY MASS INDEX (BMI) DOCUMENTED: ICD-10-PCS | Mod: CPTII,S$GLB,, | Performed by: PSYCHIATRY & NEUROLOGY

## 2023-03-13 PROCEDURE — 99204 PR OFFICE/OUTPT VISIT, NEW, LEVL IV, 45-59 MIN: ICD-10-PCS | Mod: S$GLB,,, | Performed by: PSYCHIATRY & NEUROLOGY

## 2023-03-13 PROCEDURE — 3079F DIAST BP 80-89 MM HG: CPT | Mod: CPTII,S$GLB,, | Performed by: PSYCHIATRY & NEUROLOGY

## 2023-03-13 PROCEDURE — 3008F BODY MASS INDEX DOCD: CPT | Mod: CPTII,S$GLB,, | Performed by: PSYCHIATRY & NEUROLOGY

## 2023-03-13 PROCEDURE — 1159F MED LIST DOCD IN RCRD: CPT | Mod: CPTII,S$GLB,, | Performed by: PSYCHIATRY & NEUROLOGY

## 2023-03-13 PROCEDURE — 1159F PR MEDICATION LIST DOCUMENTED IN MEDICAL RECORD: ICD-10-PCS | Mod: CPTII,S$GLB,, | Performed by: PSYCHIATRY & NEUROLOGY

## 2023-03-13 PROCEDURE — 3044F HG A1C LEVEL LT 7.0%: CPT | Mod: CPTII,S$GLB,, | Performed by: PSYCHIATRY & NEUROLOGY

## 2023-03-13 PROCEDURE — 99999 PR PBB SHADOW E&M-EST. PATIENT-LVL III: ICD-10-PCS | Mod: PBBFAC,,, | Performed by: PSYCHIATRY & NEUROLOGY

## 2023-03-13 PROCEDURE — 3079F PR MOST RECENT DIASTOLIC BLOOD PRESSURE 80-89 MM HG: ICD-10-PCS | Mod: CPTII,S$GLB,, | Performed by: PSYCHIATRY & NEUROLOGY

## 2023-03-13 PROCEDURE — 3077F PR MOST RECENT SYSTOLIC BLOOD PRESSURE >= 140 MM HG: ICD-10-PCS | Mod: CPTII,S$GLB,, | Performed by: PSYCHIATRY & NEUROLOGY

## 2023-03-13 PROCEDURE — 3044F PR MOST RECENT HEMOGLOBIN A1C LEVEL <7.0%: ICD-10-PCS | Mod: CPTII,S$GLB,, | Performed by: PSYCHIATRY & NEUROLOGY

## 2023-03-13 NOTE — PATIENT INSTRUCTIONS
SLEEP LAB ([facility]) will contact you to schedulethe sleep study. Their number is [phone] (ext [phone]). Please call them if you do not hear from them in 2 weeks from now.  [facility] is located on 7th floor of the [facility]; [place] lab is located in [facility].    SLEEP CLINIC (my assistant) will call you when the sleep study results are ready - if you have not heard from us by 2 weeks from the date of the study, please call [phone] (ext [phone]) or you can use [facility] to contact me.    You are advised to abstain from driving should you feel sleepy or drowsy.

## 2023-03-14 DIAGNOSIS — M10.9 GOUTY ARTHRITIS: Primary | ICD-10-CM

## 2023-03-15 ENCOUNTER — TELEPHONE (OUTPATIENT)
Dept: SLEEP MEDICINE | Facility: OTHER | Age: 43
End: 2023-03-15
Payer: COMMERCIAL

## 2023-03-16 ENCOUNTER — TELEPHONE (OUTPATIENT)
Dept: SLEEP MEDICINE | Facility: OTHER | Age: 43
End: 2023-03-16
Payer: COMMERCIAL

## 2023-03-16 ENCOUNTER — OFFICE VISIT (OUTPATIENT)
Dept: OTOLARYNGOLOGY | Facility: CLINIC | Age: 43
End: 2023-03-16
Payer: COMMERCIAL

## 2023-03-16 VITALS — BODY MASS INDEX: 31.39 KG/M2 | WEIGHT: 251.13 LBS

## 2023-03-16 DIAGNOSIS — J31.0 CHRONIC RHINITIS: ICD-10-CM

## 2023-03-16 DIAGNOSIS — M95.0 INCOMPETENT NASAL VALVE: ICD-10-CM

## 2023-03-16 DIAGNOSIS — J34.3 HYPERTROPHY OF INFERIOR NASAL TURBINATE: ICD-10-CM

## 2023-03-16 DIAGNOSIS — G47.30 SLEEP-DISORDERED BREATHING: Primary | ICD-10-CM

## 2023-03-16 PROCEDURE — 1159F PR MEDICATION LIST DOCUMENTED IN MEDICAL RECORD: ICD-10-PCS | Mod: CPTII,S$GLB,, | Performed by: PHYSICIAN ASSISTANT

## 2023-03-16 PROCEDURE — 31575 DIAGNOSTIC LARYNGOSCOPY: CPT | Mod: S$GLB,,, | Performed by: PHYSICIAN ASSISTANT

## 2023-03-16 PROCEDURE — 3008F PR BODY MASS INDEX (BMI) DOCUMENTED: ICD-10-PCS | Mod: CPTII,S$GLB,, | Performed by: PHYSICIAN ASSISTANT

## 2023-03-16 PROCEDURE — 3008F BODY MASS INDEX DOCD: CPT | Mod: CPTII,S$GLB,, | Performed by: PHYSICIAN ASSISTANT

## 2023-03-16 PROCEDURE — 3044F HG A1C LEVEL LT 7.0%: CPT | Mod: CPTII,S$GLB,, | Performed by: PHYSICIAN ASSISTANT

## 2023-03-16 PROCEDURE — 99999 PR PBB SHADOW E&M-EST. PATIENT-LVL III: ICD-10-PCS | Mod: PBBFAC,,, | Performed by: PHYSICIAN ASSISTANT

## 2023-03-16 PROCEDURE — 1159F MED LIST DOCD IN RCRD: CPT | Mod: CPTII,S$GLB,, | Performed by: PHYSICIAN ASSISTANT

## 2023-03-16 PROCEDURE — 99999 PR PBB SHADOW E&M-EST. PATIENT-LVL III: CPT | Mod: PBBFAC,,, | Performed by: PHYSICIAN ASSISTANT

## 2023-03-16 PROCEDURE — 31575 LARYNGOSCOPY: ICD-10-PCS | Mod: S$GLB,,, | Performed by: PHYSICIAN ASSISTANT

## 2023-03-16 PROCEDURE — 99204 PR OFFICE/OUTPT VISIT, NEW, LEVL IV, 45-59 MIN: ICD-10-PCS | Mod: 25,S$GLB,, | Performed by: PHYSICIAN ASSISTANT

## 2023-03-16 PROCEDURE — 99204 OFFICE O/P NEW MOD 45 MIN: CPT | Mod: 25,S$GLB,, | Performed by: PHYSICIAN ASSISTANT

## 2023-03-16 PROCEDURE — 3044F PR MOST RECENT HEMOGLOBIN A1C LEVEL <7.0%: ICD-10-PCS | Mod: CPTII,S$GLB,, | Performed by: PHYSICIAN ASSISTANT

## 2023-03-16 RX ORDER — FLUTICASONE PROPIONATE 50 MCG
2 SPRAY, SUSPENSION (ML) NASAL DAILY
Qty: 16 G | Refills: 2 | Status: SHIPPED | OUTPATIENT
Start: 2023-03-16 | End: 2023-08-15

## 2023-03-16 NOTE — PATIENT INSTRUCTIONS
You adenoids are not enlarged and there is not significant evidence on exam or scope that you nasal passages are causing sleep apnea.     Nasal Steroid Spray  You have been prescribed or instructed to take a nasal steroid spray (Flonase). Some symptoms will experience relief within a few days; however, it may take 2-3 weeks to begin to see improvement. This medication needs to be taken consistently to see results.    Use as directed and please be aware the Flonase takes 7-10 days of consistent use before becoming effective- so try to be patient :)    Helpful hints for maximizing nasal spray medication into the nose  - Use the opposite hand to spray the nostril (example: right hand for left nostril). This will help avoid spraying the medication onto the septum (the area that divides the left and right nasal cavity.)  - Tilt the bottle so that it is facing at a slight angle up or straight back, but avoid pointing the bottle straight up while spraying.   - Gently sniff (do not snort) a few seconds after spraying.

## 2023-03-16 NOTE — PROCEDURES
"Laryngoscopy    Date/Time: 3/16/2023 8:30 AM  Performed by: Shaan Ryan PA-C  Authorized by: Shaan Ryan PA-C     Time out: Immediately prior to procedure a "time out" was called to verify the correct patient, procedure, equipment, support staff and site/side marked as required.    Consent Done?:  Yes (Verbal)  Anesthesia:     Local anesthetic:  Lidocaine 4% and Rj-Synephrine 1/2%    Patient tolerance:  Patient tolerated the procedure well with no immediate complications  Laryngoscopy:     Areas examined:  Nasal cavities, nasopharynx, oropharynx, hypopharynx, larynx and vocal cords    Laryngoscope size:  4 mm  Nose Intranasal:      Mucosa no polyps     Mucosa ulcers not present     No mucosa lesions present     Septum gross deformity (L>R; mild)     Enlarged turbinates  Nasopharynx:      No mucosa lesions     Adenoids present     Posterior choanae patent     Eustachian tube patent  Larynx/hypopharynx:      No epiglottis lesions     No epiglottis edema     No AE folds lesions     No vocal cord polyps     Equal and normal bilateral     No hypopharynx lesions     No piriform sinus pooling     No piriform sinus lesions     No post cricoid edema     No post cricoid erythema     Stringy mucous observed  Erythematous MTs bilterally  "

## 2023-03-16 NOTE — PROGRESS NOTES
Subjective:     HPI: Elliott Hernandez is a 42 y.o. male who was self-referred for nasal obstruction.    Patient reports chronic nasal obstruction still since maybe as far back as childhood.  Patient states that the left side of his nose is usually more obstructive than the right.  Patient states that he is not a mouth breather but does notice issues breathing through his nose during the day.  Patient states at night he will have worsening unilateral obstruction depending on the positioning of his head.  Patient denies any hyposmia, facial pressure, congestion, rhinorrhea.  Patient denies any history of seasonal allergies.  Patient has tried breathe right strips that he says has improved his nighttime breathing.     Of note, patient was seen by Sleep clinic and advised sleep study.  He states that his significant other has noticed snoring and he has had chronic fatigue of unknown cause.  Patient states that even when he gets a full night's rest he feels excessive daytime sleepiness.  He denied  gasping for air, choking , and witnessed apneas.     Current sinonasal medications include none at present.  The last course of antibiotics was a long time ago.    He does not regularly use nasal decongestant sprays (afrin/oxymetazoline/phenylephrine).  He does not recall previously having allergy testing but is uncertain  He denies a history of asthma.  He denies a history of reflux symptoms.    He denies a diagnosis of obstructive sleep apnea but is being evaluated for this    He has not had sinonasal surgery.    He does not recall a prior history of nasal trauma.  He has not had a tonsillectomy.  He is not a tobacco smoker.   He has NOT had S. pneumo titers checked.    SNOT-22 score: : (P) 29  NOSE score:: (P) 55%  ETDQ-7 score:: (P) 1.1    Past Medical/Past Surgical History  No past medical history on file.  He has no past surgical history on file.    Family History/Social History  His family history includes  Cancer in his father; Heart disease in his maternal grandfather.  He reports that he has quit smoking. His smoking use included cigarettes. He does not have any smokeless tobacco history on file. He reports current alcohol use of about 6.0 standard drinks per week. He reports that he does not use drugs.    Allergies/Immunizations  He has No Known Allergies.    There is no immunization history on file for this patient.     Medications   allopurinoL     Review of Systems     Constitutional: Positive for fatigue.      Eyes:  Negative for change in eyesight, eye drainage, eye itching and photophobia.     Respiratory:  Positive for snoring.      Cardiovascular:  Negative for chest pain, foot swelling, irregular heartbeat and swollen veins.     Gastrointestinal:  Negative for abdominal pain, acid reflux, constipation, diarrhea, heartburn and vomiting.     Genitourinary: Negative for difficulty urinating, sexual problems and frequent urination.     Musc: Negative for aching joints, aching muscles, back pain and neck pain.     Skin: Negative for rash.     Allergy: Negative for food allergies and seasonal allergies.     Endocrine: Negative for cold intolerance and heat intolerance.      Neurological: Negative for dizziness, headaches, light-headedness, seizures and tremors.      Hematologic: Negative for bruises/bleeds easily and swollen glands.      Psychiatric: Positive for decreased concentration and sleep disturbance.         Objective:     There were no vitals taken for this visit.       Constitutional:   He appears well-developed and well-nourished. Normal speech.      Head:  Normocephalic and atraumatic. Facial strength is normal.      Ears:    Right Ear: No drainage or swelling. Tympanic membrane is not perforated and not erythematous. No middle ear effusion.   Left Ear: No drainage or swelling. Tympanic membrane is not perforated and not erythematous.  No middle ear effusion.     Nose:  Septal deviation (mild)  present. No mucosal edema, rhinorrhea or polyps.  No foreign bodies. Turbinate hypertrophy.  Turbinates normal and no turbinate masses.  Right sinus exhibits no maxillary sinus tenderness and no frontal sinus tenderness. Left sinus exhibits no maxillary sinus tenderness and no frontal sinus tenderness.   Gross external nasal valve collapse on exam  Negative modified dayan's    Mouth/Throat  Oropharynx clear and moist without lesions or asymmetry, normal uvula midline and lips, teeth, and gums normal. No trismus or mucous membrane lesions. No oropharyngeal exudate, posterior oropharyngeal edema or posterior oropharyngeal erythema. Tonsils present, +1.      Neck:  Neck normal without thyromegaly masses, asymmetry, normal tracheal structure, crepitus, and tenderness and phonation normal.     He has no cervical adenopathy.     Pulmonary/Chest:   Effort normal.     Psychiatric:   He has a normal mood and affect. His speech is normal and behavior is normal.     Procedure    Flexible laryngoscopy performed.  See procedure note.     L nasal valve, slight left deviation to septum     L MT     L ET    R nasal valve    R MT with stringy mucous    R ET     Larynx     VF mobility    Data Reviewed  I personally reviewed the chart, including any outside records, and pertinent data below:    WBC (K/uL)   Date Value   02/17/2023 5.43     Eosinophil % (%)   Date Value   02/17/2023 0.7     Eos # (K/uL)   Date Value   02/17/2023 0.0     Platelets (K/uL)   Date Value   02/17/2023 301     Glucose (mg/dL)   Date Value   02/17/2023 120 (H)   02/17/2023 120 (H)     No results found for: IGE    No sinus imaging available.    Assessment & Plan:     1. Sleep-disordered breathing  -     No evidence of nasal obstruction on scope; adenoid tissue not enlarged  - Advised completion of sleep study    2. Incompetent nasal valve  3. Hypertrophy of inferior nasal turbinate  -     Current and chronic nasal obstructive symptoms reported   - Breathe  right strips seem to help; suspect external nasal valve incompetence  -  advised patient to START fluticasone propionate and also educated patient on how to properly use nasal sprays  - If no change in breathing, patient will need surgical consultation     4. Chronic rhinitis  -     noted on laryngoscopy  - flonase    He will Follow up in about 2 months (around 5/16/2023) for re-evaluate post treatment.  I had a discussion with the patient regarding his condition and the further workup and management options.    All questions were answered, and the patient is in agreement with the above.     Disclaimer:  This note may have been prepared utilizing voice recognition software which may result in occasional typographical errors in the text such as sound alike words.   If further clarification is needed, please contact the ENT department of [facility].

## 2023-03-20 ENCOUNTER — TELEPHONE (OUTPATIENT)
Dept: SLEEP MEDICINE | Facility: OTHER | Age: 43
End: 2023-03-20
Payer: COMMERCIAL

## 2023-03-21 ENCOUNTER — HOSPITAL ENCOUNTER (OUTPATIENT)
Dept: SLEEP MEDICINE | Facility: OTHER | Age: 43
Discharge: HOME | End: 2023-03-21
Attending: PSYCHIATRY & NEUROLOGY
Payer: COMMERCIAL

## 2023-03-21 DIAGNOSIS — G47.33 OSA (OBSTRUCTIVE SLEEP APNEA): Primary | ICD-10-CM

## 2023-03-21 DIAGNOSIS — G47.30 SLEEP APNEA, UNSPECIFIED TYPE: ICD-10-CM

## 2023-03-21 PROCEDURE — 95800 SLP STDY UNATTENDED: CPT

## 2023-03-22 PROBLEM — G47.30 SLEEP APNEA: Status: ACTIVE | Noted: 2023-03-22

## 2023-03-28 ENCOUNTER — PATIENT MESSAGE (OUTPATIENT)
Dept: RHEUMATOLOGY | Facility: CLINIC | Age: 43
End: 2023-03-28
Payer: COMMERCIAL

## 2023-03-28 PROCEDURE — 95806 PR SLEEP STUDY, UNATTENDED, SIMUL RECORD HR/O2 SAT/RESP FLOW/RESP EFFT: ICD-10-PCS | Mod: 26,,, | Performed by: PSYCHIATRY & NEUROLOGY

## 2023-03-28 PROCEDURE — 95806 SLEEP STUDY UNATT&RESP EFFT: CPT | Mod: 26,,, | Performed by: PSYCHIATRY & NEUROLOGY

## 2023-03-28 RX ORDER — COLCHICINE 0.6 MG/1
0.6 CAPSULE ORAL DAILY
Qty: 30 CAPSULE | Refills: 11 | Status: SHIPPED | OUTPATIENT
Start: 2023-03-28 | End: 2023-07-20 | Stop reason: HOSPADM

## 2023-03-28 RX ORDER — PREDNISONE 20 MG/1
20 TABLET ORAL DAILY
Qty: 10 TABLET | Refills: 0 | Status: SHIPPED | OUTPATIENT
Start: 2023-03-28 | End: 2023-07-20 | Stop reason: HOSPADM

## 2023-03-30 ENCOUNTER — PATIENT MESSAGE (OUTPATIENT)
Dept: SLEEP MEDICINE | Facility: CLINIC | Age: 43
End: 2023-03-30
Payer: COMMERCIAL

## 2023-03-30 DIAGNOSIS — G47.33 OSA (OBSTRUCTIVE SLEEP APNEA): Primary | ICD-10-CM

## 2023-05-08 ENCOUNTER — LAB VISIT (OUTPATIENT)
Dept: LAB | Facility: HOSPITAL | Age: 43
End: 2023-05-08
Attending: INTERNAL MEDICINE
Payer: COMMERCIAL

## 2023-05-08 DIAGNOSIS — M10.9 GOUTY ARTHRITIS: ICD-10-CM

## 2023-05-08 LAB
ALBUMIN SERPL BCP-MCNC: 4.1 G/DL (ref 3.5–5.2)
ALP SERPL-CCNC: 48 U/L (ref 55–135)
ALT SERPL W/O P-5'-P-CCNC: 50 U/L (ref 10–44)
ANION GAP SERPL CALC-SCNC: 10 MMOL/L (ref 8–16)
AST SERPL-CCNC: 36 U/L (ref 10–40)
BILIRUB SERPL-MCNC: 0.9 MG/DL (ref 0.1–1)
BUN SERPL-MCNC: 12 MG/DL (ref 6–20)
CALCIUM SERPL-MCNC: 9.1 MG/DL (ref 8.7–10.5)
CHLORIDE SERPL-SCNC: 106 MMOL/L (ref 95–110)
CO2 SERPL-SCNC: 24 MMOL/L (ref 23–29)
CREAT SERPL-MCNC: 1 MG/DL (ref 0.5–1.4)
EST. GFR  (NO RACE VARIABLE): >60 ML/MIN/1.73 M^2
GLUCOSE SERPL-MCNC: 107 MG/DL (ref 70–110)
POTASSIUM SERPL-SCNC: 4.1 MMOL/L (ref 3.5–5.1)
PROT SERPL-MCNC: 7 G/DL (ref 6–8.4)
SODIUM SERPL-SCNC: 140 MMOL/L (ref 136–145)
URATE SERPL-MCNC: 6.3 MG/DL (ref 3.4–7)

## 2023-05-08 PROCEDURE — 84550 ASSAY OF BLOOD/URIC ACID: CPT | Performed by: INTERNAL MEDICINE

## 2023-05-08 PROCEDURE — 36415 COLL VENOUS BLD VENIPUNCTURE: CPT | Mod: PO | Performed by: INTERNAL MEDICINE

## 2023-05-08 PROCEDURE — 80053 COMPREHEN METABOLIC PANEL: CPT | Performed by: INTERNAL MEDICINE

## 2023-05-30 ENCOUNTER — OFFICE VISIT (OUTPATIENT)
Dept: RHEUMATOLOGY | Facility: CLINIC | Age: 43
End: 2023-05-30
Payer: COMMERCIAL

## 2023-05-30 VITALS
DIASTOLIC BLOOD PRESSURE: 79 MMHG | HEART RATE: 79 BPM | SYSTOLIC BLOOD PRESSURE: 117 MMHG | BODY MASS INDEX: 31.96 KG/M2 | WEIGHT: 255.75 LBS

## 2023-05-30 DIAGNOSIS — Z79.899 ENCOUNTER FOR MONITORING ALLOPURINOL THERAPY: ICD-10-CM

## 2023-05-30 DIAGNOSIS — M10.9 GOUTY ARTHRITIS: Primary | ICD-10-CM

## 2023-05-30 DIAGNOSIS — Z51.81 ENCOUNTER FOR MONITORING ALLOPURINOL THERAPY: ICD-10-CM

## 2023-05-30 PROCEDURE — 99999 PR PBB SHADOW E&M-EST. PATIENT-LVL III: ICD-10-PCS | Mod: PBBFAC,,, | Performed by: INTERNAL MEDICINE

## 2023-05-30 PROCEDURE — 3078F DIAST BP <80 MM HG: CPT | Mod: CPTII,S$GLB,, | Performed by: INTERNAL MEDICINE

## 2023-05-30 PROCEDURE — 3044F PR MOST RECENT HEMOGLOBIN A1C LEVEL <7.0%: ICD-10-PCS | Mod: CPTII,S$GLB,, | Performed by: INTERNAL MEDICINE

## 2023-05-30 PROCEDURE — 99999 PR PBB SHADOW E&M-EST. PATIENT-LVL III: CPT | Mod: PBBFAC,,, | Performed by: INTERNAL MEDICINE

## 2023-05-30 PROCEDURE — 99214 PR OFFICE/OUTPT VISIT, EST, LEVL IV, 30-39 MIN: ICD-10-PCS | Mod: S$GLB,,, | Performed by: INTERNAL MEDICINE

## 2023-05-30 PROCEDURE — 3044F HG A1C LEVEL LT 7.0%: CPT | Mod: CPTII,S$GLB,, | Performed by: INTERNAL MEDICINE

## 2023-05-30 PROCEDURE — 3078F PR MOST RECENT DIASTOLIC BLOOD PRESSURE < 80 MM HG: ICD-10-PCS | Mod: CPTII,S$GLB,, | Performed by: INTERNAL MEDICINE

## 2023-05-30 PROCEDURE — 3074F PR MOST RECENT SYSTOLIC BLOOD PRESSURE < 130 MM HG: ICD-10-PCS | Mod: CPTII,S$GLB,, | Performed by: INTERNAL MEDICINE

## 2023-05-30 PROCEDURE — 99214 OFFICE O/P EST MOD 30 MIN: CPT | Mod: S$GLB,,, | Performed by: INTERNAL MEDICINE

## 2023-05-30 PROCEDURE — 1159F PR MEDICATION LIST DOCUMENTED IN MEDICAL RECORD: ICD-10-PCS | Mod: CPTII,S$GLB,, | Performed by: INTERNAL MEDICINE

## 2023-05-30 PROCEDURE — 3008F PR BODY MASS INDEX (BMI) DOCUMENTED: ICD-10-PCS | Mod: CPTII,S$GLB,, | Performed by: INTERNAL MEDICINE

## 2023-05-30 PROCEDURE — 3074F SYST BP LT 130 MM HG: CPT | Mod: CPTII,S$GLB,, | Performed by: INTERNAL MEDICINE

## 2023-05-30 PROCEDURE — 1159F MED LIST DOCD IN RCRD: CPT | Mod: CPTII,S$GLB,, | Performed by: INTERNAL MEDICINE

## 2023-05-30 PROCEDURE — 3008F BODY MASS INDEX DOCD: CPT | Mod: CPTII,S$GLB,, | Performed by: INTERNAL MEDICINE

## 2023-05-30 NOTE — PROGRESS NOTES
Subjective:       Patient ID: Elliott Hernandez is a 42 y.o. male.    Chief Complaint: Disease Management   HPI: 42 year old M  with presumed gout here to establish care. In 2018, he had episode of pain and swelling in left ankle.  He had steroid injection and it helped.  In 2019 and 2020,  it affected his left ankle. Most recently, he had pain and swelling in left knee. He had trouble walking or bending his knee.  He took anti inflammatory.    He reports medrol normally works him better.  Denies any rashes, oral ulcers,photosensitivity, or raynauds.   He drinks on occasion during vacations.  Family hx:  negative for psoriasis; gout: diet       Interval history: he was diagnosed with CHUCKY recently. He is taking allopurinol and colchicine daily.  Denies any flares.    Review of Systems   Constitutional:  Negative for activity change, appetite change, chills, diaphoresis, fatigue, fever and unexpected weight change.   HENT:  Negative for ear discharge, ear pain, hearing loss, mouth sores, nosebleeds, sinus pressure and trouble swallowing.    Eyes: Negative.  Negative for photophobia, pain, discharge, redness and itching.   Respiratory:  Negative for cough, chest tightness and shortness of breath.    Cardiovascular:  Negative for chest pain, palpitations and leg swelling.   Gastrointestinal:  Negative for abdominal distention, blood in stool, constipation, diarrhea and nausea.   Endocrine: Negative for cold intolerance, heat intolerance, polydipsia and polyphagia.   Genitourinary:  Negative for flank pain, genital sores and hematuria.   Musculoskeletal:  Positive for arthralgias and joint swelling. Negative for back pain, gait problem and neck pain.   Skin:  Negative for color change, pallor and rash.   Neurological:  Negative for dizziness, weakness, light-headedness and headaches.   Hematological:  Negative for adenopathy. Does not bruise/bleed easily.   Psychiatric/Behavioral:  Negative for decreased  "concentration and hallucinations. The patient is not nervous/anxious.          Objective:   /84   Pulse 74   Ht 6' 3" (1.905 m)   Wt 112.6 kg (248 lb 3.8 oz)   BMI 31.03 kg/m²      Physical Exam   Constitutional: normal appearance.   HENT:   Head: Normocephalic and atraumatic.   Nose: Nose normal. No rhinorrhea or nasal congestion.   Mouth/Throat: Mucous membranes are dry. Oropharynx is clear.   Eyes: Pupils are equal, round, and reactive to light. Conjunctivae are normal. Right eye exhibits no discharge. Left eye exhibits no discharge. No scleral icterus.   Cardiovascular: Normal rate and regular rhythm. Exam reveals no gallop and no friction rub.   No murmur heard.  Pulmonary/Chest: No stridor. No respiratory distress. He has no wheezes.   Abdominal: Bowel sounds are normal. He exhibits no distension and no mass. There is no abdominal tenderness.   Musculoskeletal:         General: Tenderness present. Normal range of motion.   Neurological: He is alert.   Skin: No bruising noted. No jaundice or pallor.   Mild tenderness in left knee with extension      No data to display     Assessment:     42 year old M  with presumed gout here for follow up of gout.. He has episodes of left ankle swelling and left knee swelling when he flares.  He reports flares are usually in setting if eating meat and also alcohol.        Plan:       Problem List Items Addressed This Visit    None    continue allopurinol 200mg po qday  Continue colchicine 0.6 mg po qd ay  labs        30 * minutes of total time spent on the encounter, which includes face to face time and non-face to face time preparing to see the patient (eg, review of tests), Obtaining and/or reviewing separately obtained history, Documenting clinical information in the electronic or other health record, Independently interpreting results (not separately reported) and communicating results to the patient/family/caregiver, or Care coordination (not separately " reported).

## 2023-06-12 ENCOUNTER — OFFICE VISIT (OUTPATIENT)
Dept: SLEEP MEDICINE | Facility: CLINIC | Age: 43
End: 2023-06-12
Payer: COMMERCIAL

## 2023-06-12 VITALS
SYSTOLIC BLOOD PRESSURE: 134 MMHG | HEART RATE: 89 BPM | BODY MASS INDEX: 31.62 KG/M2 | WEIGHT: 253 LBS | DIASTOLIC BLOOD PRESSURE: 89 MMHG

## 2023-06-12 DIAGNOSIS — G47.33 OSA (OBSTRUCTIVE SLEEP APNEA): Primary | ICD-10-CM

## 2023-06-12 PROCEDURE — 1159F PR MEDICATION LIST DOCUMENTED IN MEDICAL RECORD: ICD-10-PCS | Mod: CPTII,S$GLB,, | Performed by: PSYCHIATRY & NEUROLOGY

## 2023-06-12 PROCEDURE — 3079F PR MOST RECENT DIASTOLIC BLOOD PRESSURE 80-89 MM HG: ICD-10-PCS | Mod: CPTII,S$GLB,, | Performed by: PSYCHIATRY & NEUROLOGY

## 2023-06-12 PROCEDURE — 99999 PR PBB SHADOW E&M-EST. PATIENT-LVL III: ICD-10-PCS | Mod: PBBFAC,,, | Performed by: PSYCHIATRY & NEUROLOGY

## 2023-06-12 PROCEDURE — 99999 PR PBB SHADOW E&M-EST. PATIENT-LVL III: CPT | Mod: PBBFAC,,, | Performed by: PSYCHIATRY & NEUROLOGY

## 2023-06-12 PROCEDURE — 3075F SYST BP GE 130 - 139MM HG: CPT | Mod: CPTII,S$GLB,, | Performed by: PSYCHIATRY & NEUROLOGY

## 2023-06-12 PROCEDURE — 99215 OFFICE O/P EST HI 40 MIN: CPT | Mod: S$GLB,,, | Performed by: PSYCHIATRY & NEUROLOGY

## 2023-06-12 PROCEDURE — 3008F BODY MASS INDEX DOCD: CPT | Mod: CPTII,S$GLB,, | Performed by: PSYCHIATRY & NEUROLOGY

## 2023-06-12 PROCEDURE — 3044F HG A1C LEVEL LT 7.0%: CPT | Mod: CPTII,S$GLB,, | Performed by: PSYCHIATRY & NEUROLOGY

## 2023-06-12 PROCEDURE — 3044F PR MOST RECENT HEMOGLOBIN A1C LEVEL <7.0%: ICD-10-PCS | Mod: CPTII,S$GLB,, | Performed by: PSYCHIATRY & NEUROLOGY

## 2023-06-12 PROCEDURE — 1159F MED LIST DOCD IN RCRD: CPT | Mod: CPTII,S$GLB,, | Performed by: PSYCHIATRY & NEUROLOGY

## 2023-06-12 PROCEDURE — 3075F PR MOST RECENT SYSTOLIC BLOOD PRESS GE 130-139MM HG: ICD-10-PCS | Mod: CPTII,S$GLB,, | Performed by: PSYCHIATRY & NEUROLOGY

## 2023-06-12 PROCEDURE — 99215 PR OFFICE/OUTPT VISIT, EST, LEVL V, 40-54 MIN: ICD-10-PCS | Mod: S$GLB,,, | Performed by: PSYCHIATRY & NEUROLOGY

## 2023-06-12 PROCEDURE — 3008F PR BODY MASS INDEX (BMI) DOCUMENTED: ICD-10-PCS | Mod: CPTII,S$GLB,, | Performed by: PSYCHIATRY & NEUROLOGY

## 2023-06-12 PROCEDURE — 3079F DIAST BP 80-89 MM HG: CPT | Mod: CPTII,S$GLB,, | Performed by: PSYCHIATRY & NEUROLOGY

## 2023-06-12 NOTE — PROGRESS NOTES
EPWORTH SLEEPINESS SCALE TOTAL SCORE  2023 3/10/2023   Total score 9 13       Elliott Hernandez is a 42 y.o. male seen today for CPAP  follow up. Last seen on 3/13/2023.    The patient reports improved sleep continuity and daytime sleepiness on PAP when he can use his machine for long enough hours.. ESS today is 824.  Denies break through snoring.  No  dry mouth.  No aerophagia or air hunger. Denies occasional mask leaks and discomfort. Using a nasal mask    He believes that his biggest barrier to using his machine more content compliantly is his difficulty with nasal breathing.  He is planning to see the ear nose throat doctor soon. He has not experienced relief on Flonase.  He is also wondering if he can try the full face mask in the meantime.       Elliott Hernandez  2023 - 2023  Patient ID: [id number]  : 1980  Age: 42 years  Gender: Male  [facility] [address] [facility], [address]  Compliance Report  Compliance  Payor Standard  Usage 2023 - 2023  Usage days 29/30 days (97%)  >= 4 hours 18 days (60%)  < 4 hours 11 days (37%)  Usage hours 131 hours 6 minutes  Average usage (total days) 4 hours 22 minutes  Average usage (days used) 4 hours 31 minutes  Median usage (days used) 4 hours 35 minutes  Total used hours (value since last reset - 2023) 265 hours  AirSense 11 AutoSet  Serial number [id number]  Mode AutoSet  Min Pressure 5 cmH2O  Max Pressure 20 cmH2O  EPR Fulltime  EPR level 3  Response Standard  Therapy  Pressure - cmH2O Median: 6.2 95th percentile: 7.8 Maximum: 8.5  Leaks - L/min Median: 0.0 95th percentile: 1.4 Maximum: 5.7  Events per hour AI: 1.0 HI: 0.2 AHI: 1.2  Apnea Index Central: 0.6 Obstructive: 0.5 Unknown: 0.0  RERA Index 0.0  Cheyne-Dudley respiration (average duration per night) 0 minutes (0%    He is a light sleeper    Medications pertinent to sleep  disorders taken currently: no  Previous  medications taken  for  sleep disorders:  no    Sleep studies  HST 2022: Patient underwent a 1 night Home Sleep Test and by behavioral criteria, slept for approximately  5.63 hours, with a sleep latency of 2 minutes and a sleep efficiency of 89.2%. Severe sleep disordered breathing (AHI=31) is  noted based on a 4% hypopnea desaturation criteria, predominantly in the supine position (40 events/hour). The patient  slept supine 65% of the night based on valid recording time of 5.63 hours and is 2.5 times as likely to have  apneas/hypopneas when supine. When considering more subtle measures of sleep disordered breathing, the overall  respiratory disturbance index is severe(RDI=51) based on a 1% hypopnea desaturation criteria with confirmation by  surrogate arousal indicators. The apneas/hypopneas are accompanied by minimal oxygen desaturation (percent time  below 90% SpO2: 1.4%, Min SpO2: 83.3%). The average desaturation across all sleep disordered breathing events is 3.4%.  Snoring occurs for 4.1% (30 dB) of the study. The mean pulse rate is 65 BPM, with very frequent pulse rate variability (84  events with >= 6 BPM increase/decrease per hour).      Occupation: owns business. In construction.  Bed partner: his wife  Exercise routine: long walks, bike  Caffeine:  AM beverages per day  Alcohol: weekends wine with dinner - trying to limit that+ now.  Smoking:no  EPWORTH SLEEPINESS SCALE TOTAL SCORE  6/12/2023 3/10/2023   Total score 9 13           PHYSICAL EXAM:  /89 (BP Location: Left arm, Patient Position: Sitting, BP Method: Large (Automatic))   Pulse 89   Wt 114.8 kg (253 lb)   BMI 31.62 kg/m²   GENERAL: Overweight body habitus, well groomed.    ASSESSMENT:    1. CHUCKY - sever The patient symptomatically has  snoring, gasping for air, choking , excessive daytime sleepiness, and excessive daytime fatigue  with exam findings of crowded airway and elevated BMI. The patient has medical co-morbidities of chronic fatigue  which can be  worsened by CHUCKY. This warrants further investigation for possible obstructive sleep apnea.              PLAN:    Sleep study were discussed with graphs and chart demonstration, all the questions were answered.   The importance of sleep apnea treatment was reinforced.    Continue APAP at the same stetting's  Will try a full face mask - he will be seeing a respiratory therapist right after this appointment here in [place] our [place] sleep branch.  I have shown him how to adjust humidity as it may have a beneficial effect on nasal congestion.  I have also described how to cool down the humidifier, in case he did humidity bothers him.    HE will follow up with ENT for chronic nasal congestion.         During our discussion today, we talked about the etiology of  CHUCKY as well as the potential ramifications of untreated sleep apnea, which could include daytime sleepiness, hypertension, heart disease and/or stroke.  We discussed potential treatment options, which could include weight loss, body positioning, continuous positive airway pressure (CPAP), or referral for surgical consideration. Meanwhile, he  is urged to avoid supine sleep, weight gain and alcoholic beverages since all of these can worsen CHUCKY.     The patient was given open opportunity to ask questions and/or express concerns about treatment plan. Two point patient identifier confirmed.       Precautions: The patient was advised to abstain from driving should he feel sleepy or drowsy.    Follow up: MD millerw.     46-minute visit. >50% spent counseling patient and coordination of care.  The patient was  cautioned against drowsy driving.

## 2023-07-10 ENCOUNTER — PATIENT MESSAGE (OUTPATIENT)
Dept: ORTHOPEDICS | Facility: CLINIC | Age: 43
End: 2023-07-10
Payer: COMMERCIAL

## 2023-07-10 ENCOUNTER — TELEPHONE (OUTPATIENT)
Dept: ORTHOPEDICS | Facility: CLINIC | Age: 43
End: 2023-07-10
Payer: COMMERCIAL

## 2023-07-10 DIAGNOSIS — M79.642 LEFT HAND PAIN: Primary | ICD-10-CM

## 2023-07-10 NOTE — TELEPHONE ENCOUNTER
Spoke c pt. Pts hand was lacerated yesterday s/p FOOSH while cutting his grass, landed on an nearby gate. Pt stated that has has good motion in 2-5, but not his thumb. Pt was seen at  and records were requested. Pt did not feel comfortable following up with their recommended MD. Pt will obtain new XR with [facility] tomorrow prior to the appt. Confirmed appt location & time c Dr. Salas 7/11/23 with XR prior to appt at 9 a.m. Pt expressed understanding & was thankful.

## 2023-07-11 ENCOUNTER — HOSPITAL ENCOUNTER (OUTPATIENT)
Dept: RADIOLOGY | Facility: OTHER | Age: 43
Discharge: HOME | End: 2023-07-11
Attending: ORTHOPAEDIC SURGERY
Payer: COMMERCIAL

## 2023-07-11 ENCOUNTER — OFFICE VISIT (OUTPATIENT)
Dept: ORTHOPEDICS | Facility: CLINIC | Age: 43
End: 2023-07-11
Payer: COMMERCIAL

## 2023-07-11 VITALS
BODY MASS INDEX: 31.46 KG/M2 | HEIGHT: 75 IN | HEART RATE: 87 BPM | SYSTOLIC BLOOD PRESSURE: 124 MMHG | DIASTOLIC BLOOD PRESSURE: 80 MMHG | WEIGHT: 253 LBS

## 2023-07-11 DIAGNOSIS — M79.642 LEFT HAND PAIN: ICD-10-CM

## 2023-07-11 DIAGNOSIS — M79.642 LEFT HAND PAIN: Primary | ICD-10-CM

## 2023-07-11 PROCEDURE — 3008F PR BODY MASS INDEX (BMI) DOCUMENTED: ICD-10-PCS | Mod: CPTII,S$GLB,, | Performed by: ORTHOPAEDIC SURGERY

## 2023-07-11 PROCEDURE — 3079F PR MOST RECENT DIASTOLIC BLOOD PRESSURE 80-89 MM HG: ICD-10-PCS | Mod: CPTII,S$GLB,, | Performed by: ORTHOPAEDIC SURGERY

## 2023-07-11 PROCEDURE — 3074F PR MOST RECENT SYSTOLIC BLOOD PRESSURE < 130 MM HG: ICD-10-PCS | Mod: CPTII,S$GLB,, | Performed by: ORTHOPAEDIC SURGERY

## 2023-07-11 PROCEDURE — 99999 PR PBB SHADOW E&M-EST. PATIENT-LVL IV: ICD-10-PCS | Mod: PBBFAC,,, | Performed by: ORTHOPAEDIC SURGERY

## 2023-07-11 PROCEDURE — 99999 PR PBB SHADOW E&M-EST. PATIENT-LVL IV: CPT | Mod: PBBFAC,,, | Performed by: ORTHOPAEDIC SURGERY

## 2023-07-11 PROCEDURE — 99214 PR OFFICE/OUTPT VISIT, EST, LEVL IV, 30-39 MIN: ICD-10-PCS | Mod: S$GLB,,, | Performed by: ORTHOPAEDIC SURGERY

## 2023-07-11 PROCEDURE — 73130 X-RAY EXAM OF HAND: CPT | Mod: 26,LT,, | Performed by: RADIOLOGY

## 2023-07-11 PROCEDURE — 3074F SYST BP LT 130 MM HG: CPT | Mod: CPTII,S$GLB,, | Performed by: ORTHOPAEDIC SURGERY

## 2023-07-11 PROCEDURE — 3044F PR MOST RECENT HEMOGLOBIN A1C LEVEL <7.0%: ICD-10-PCS | Mod: CPTII,S$GLB,, | Performed by: ORTHOPAEDIC SURGERY

## 2023-07-11 PROCEDURE — 3008F BODY MASS INDEX DOCD: CPT | Mod: CPTII,S$GLB,, | Performed by: ORTHOPAEDIC SURGERY

## 2023-07-11 PROCEDURE — 73130 XR HAND COMPLETE 3 VIEW LEFT: ICD-10-PCS | Mod: 26,LT,, | Performed by: RADIOLOGY

## 2023-07-11 PROCEDURE — 1159F PR MEDICATION LIST DOCUMENTED IN MEDICAL RECORD: ICD-10-PCS | Mod: CPTII,S$GLB,, | Performed by: ORTHOPAEDIC SURGERY

## 2023-07-11 PROCEDURE — 3044F HG A1C LEVEL LT 7.0%: CPT | Mod: CPTII,S$GLB,, | Performed by: ORTHOPAEDIC SURGERY

## 2023-07-11 PROCEDURE — 3079F DIAST BP 80-89 MM HG: CPT | Mod: CPTII,S$GLB,, | Performed by: ORTHOPAEDIC SURGERY

## 2023-07-11 PROCEDURE — 99214 OFFICE O/P EST MOD 30 MIN: CPT | Mod: S$GLB,,, | Performed by: ORTHOPAEDIC SURGERY

## 2023-07-11 PROCEDURE — 1159F MED LIST DOCD IN RCRD: CPT | Mod: CPTII,S$GLB,, | Performed by: ORTHOPAEDIC SURGERY

## 2023-07-11 PROCEDURE — 73130 X-RAY EXAM OF HAND: CPT | Mod: TC,FY,LT

## 2023-07-11 RX ORDER — HYDROCODONE POLISTIREX AND CHLORPHENIRAMINE POLISTIREX 10; 8 MG/5ML; MG/5ML
SUSPENSION, EXTENDED RELEASE ORAL
COMMUNITY
End: 2023-09-08

## 2023-07-11 RX ORDER — LULICONAZOLE 10 MG/G
CREAM TOPICAL
COMMUNITY
End: 2023-09-08

## 2023-07-11 RX ORDER — OXYCODONE AND ACETAMINOPHEN 5; 325 MG/1; MG/1
1 TABLET ORAL EVERY 6 HOURS PRN
COMMUNITY
Start: 2023-07-09 | End: 2023-07-12

## 2023-07-11 RX ORDER — CEPHALEXIN 500 MG/1
500 CAPSULE ORAL
COMMUNITY
Start: 2023-07-09 | End: 2023-07-14

## 2023-07-11 RX ORDER — HYDROCODONE BITARTRATE AND ACETAMINOPHEN 7.5; 325 MG/1; MG/1
1 TABLET ORAL EVERY 6 HOURS PRN
COMMUNITY
End: 2023-09-08

## 2023-07-11 RX ORDER — IBUPROFEN AND FAMOTIDINE 26.6; 8 MG/1; MG/1
TABLET ORAL
COMMUNITY
End: 2023-09-08

## 2023-07-11 RX ORDER — KETOCONAZOLE 20 MG/G
1 CREAM TOPICAL DAILY
COMMUNITY
End: 2023-09-08

## 2023-07-11 RX ORDER — METHYLPREDNISOLONE 4 MG/1
TABLET ORAL
COMMUNITY
Start: 2023-03-29 | End: 2023-09-08

## 2023-07-11 RX ORDER — ACETAMINOPHEN, CAFFEINE, DIHYDROCODEINE BITARTRATE 320.5; 30; 16 MG/1; MG/1; MG/1
CAPSULE ORAL
COMMUNITY
End: 2023-09-08

## 2023-07-11 RX ORDER — AZITHROMYCIN 250 MG/1
TABLET, FILM COATED ORAL
COMMUNITY
End: 2023-09-08

## 2023-07-11 RX ORDER — MONTELUKAST SODIUM 10 MG/1
TABLET ORAL
COMMUNITY
End: 2023-09-08

## 2023-07-11 NOTE — H&P (VIEW-ONLY)
[facility]  History & Physical  Orthopedics    SUBJECTIVE:      COVID-19 attestation:  This patient was treated during the COVID-19 pandemic.  This was discussed with the patient, they are aware of our current policies and procedures, were given the option of delaying their visit and or switching to a virtual visit, delaying their surgery when applicable, and they elect to proceed.    Chief Complaint:   Chief Complaint   Patient presents with    Left Hand - Pain       Referring Provider: N/A     History of Present Illness:  Patient is a 42 y.o. left hand dominant male who presents today with complaints of Left hand laceration. [place] reached [place] to brace his fall and grabbed a metal fence sustaining the laceration to his hand. He then presented to [facility] where it had been sutured repaired. He was given Keflex antibiotics and Tetanus. He initially complained of decreased movement in this thumb as well as pain. Xrs as noted below.      The patient denies any fevers, chills, N/V, D/C and presents for evaluation.       No past medical history on file.  No past surgical history on file.  Review of patient's allergies indicates:  No Known Allergies  Social History     Social History Narrative    Not on file     Family History   Problem Relation Age of Onset    Cancer Father     Heart disease Maternal Grandfather          Current Outpatient Medications:     acetaminophen-caff-dihydrocod 320.5-30-16 mg Cap, Trezix 320.5 mg-30 mg-16 mg capsule  Take 2 capsules every 6 hours by oral route for 7 days., Disp: , Rfl:     allopurinoL (ZYLOPRIM) 100 MG tablet, TAKE 2 TABLETS(200 MG) BY MOUTH EVERY DAY, Disp: 60 tablet, Rfl: 2    allopurinoL (ZYLOPRIM) 300 MG tablet, Take 1 tablet (300 mg total) by mouth once daily., Disp: 30 tablet, Rfl: 6    azithromycin (Z-TIANNA) 250 MG tablet, TAKE 2 TABLETS BY MOUTH EVERY DAY FOR 1 DAY THEN TAKE 1 TABLET BY MOUTH  EVERY DAY FOR 4 DAYS, Disp: , Rfl:     cephALEXin (KEFLEX) 500 MG capsule, Take 500 mg by mouth., Disp: , Rfl:     flu vac qs 2020,4 yr up,CD,PF, 60 mcg (15 mcg x 4)/0.5 mL Syrg, ADM 0.5ML IM UTD, Disp: , Rfl:     flu vacc qs 2019-20, 6 mos up, 60 mcg (15 mcg x 4)/0.5 mL Susp, , Disp: , Rfl:     fluticasone propionate (FLONASE) 50 mcg/actuation nasal spray, 2 sprays (100 mcg total) by Each Nostril route once daily., Disp: 16 g, Rfl: 2    HYDROcodone-acetaminophen (NORCO) 7.5-325 mg per tablet, Take 1 tablet by mouth every 6 (six) hours as needed., Disp: , Rfl:     hydrocodone-chlorpheniramine (TUSSIONEX) 10-8 mg/5 mL suspension, , Disp: , Rfl:     ibuprofen-famotidine (DUEXIS) 800-26.6 mg Tab, Duexis 800 mg-26.6 mg tablet  Take 1 tablet 3 times a day by oral route for 30 days., Disp: , Rfl:     ketoconazole (NIZORAL) 2 % cream, 1 application  once daily., Disp: , Rfl:     luliconazole 1 % Crea, APPLY A THIN LAYER TO THE TO THE AFFECTED AREA PLUS A 1 INCH MARGIN OF HEALTHY SURROUNDING SKIN, Disp: , Rfl:     methylPREDNISolone (MEDROL DOSEPACK) 4 mg tablet, FOLLOW PACKAGE DIRECTIONS, Disp: , Rfl:     montelukast (SINGULAIR) 10 mg tablet, , Disp: , Rfl:     oxyCODONE-acetaminophen (PERCOCET) 5-325 mg per tablet, Take 1 tablet by mouth every 6 (six) hours as needed., Disp: , Rfl:     colchicine (MITIGARE) 0.6 mg Cap, Take 1 capsule (0.6 mg total) by mouth once daily., Disp: 30 capsule, Rfl: 11    predniSONE (DELTASONE) 20 MG tablet, Take 1 tablet (20 mg total) by mouth once daily., Disp: 10 tablet, Rfl: 0    ROS    Review of Systems:  Constitutional: no fever or chills  Eyes: no visual changes  ENT: no nasal congestion or sore throat  Respiratory: no cough or shortness of breath  Cardiovascular: no chest pain  Gastrointestinal: no nausea or vomiting, tolerating diet  Musculoskeletal: pain and soreness    OBJECTIVE:      Vital Signs (Most Recent):  Vitals:    07/11/23 0917   BP: 124/80   Pulse: 87   Weight: 114.8 kg (253  "lb)   Height: 6' 3" (1.905 m)     Body mass index is 31.62 kg/m².    Physical Exam    Physical Exam:  Constitutional: The patient appears well-developed and well-nourished. No distress.   Head: Normocephalic and atraumatic.   Nose: Nose normal.   Eyes: Conjunctivae and EOM are normal.   Neck: No tracheal deviation present.   Cardiovascular: Normal rate and intact distal pulses.    Pulmonary/Chest: Effort normal. No respiratory distress.   Abdominal: There is no guarding.   Lymphatic: Negative for adenopathy   Neurological: The patient is alert.   Psychiatric: The patient has a normal mood and affect.     Cervical Exam:  ROM full, supple  Negative Spurling's  Negative Soliz's    Bilateral Hand/Wrist Examination:    Observation/Inspection:  Swelling  +over thenar eminence Left thumb   Deformity  none  Discoloration  none     Scars   none    Atrophy  none    HAND/WRIST EXAMINATION:    ROM hand/wrist/elbow full Right hand  Examination of Left hand ROM limited 2/2 swelling  Incision c/d/I, sutures intact, small amount of serosanguinous drainage.    Neurovascular Exam:  Digits WWP, brisk CR < 3s throughout  NVI motor/LTS to M/R/U nerves, radial pulse 2+  2+ biceps and brachioradialis reflexes    L hand appears to be grossly NVI however limited 2/2 swelling.     Diagnostic Results:  X-Ray Hand Complete Left  EXAMINATION:  XR HAND COMPLETE 3 VIEW LEFT    CLINICAL HISTORY:  Pain in left hand    FINDINGS:  Hand complete three views left: There is soft tissue gas a soft tissue defect and soft tissue swelling seen are soft tissues.  There may be 2 soft tissue foreign bodies.    Electronically signed by: Marlo Mayfield MD  Date:    07/11/2023  Time:    09:17     X-rays AP, lateral and oblique L hand taken today are independently reviewed by me and show soft tissue edema, ?foreign body vs sesamoid.     ASSESSMENT/PLAN:      42 y.o. yo male with Left hand laceration following a fall, s/p suture repair.     Plan:    Will plan to " follow up in 1 week.   Thumb spica splint  Continue to take Keflex was previously prescribed.  He knows to call sooner if any issues arise.     The patient's pathophysiology was explained in detail with reference to x-rays, models, other visual aids as appropriate.  Treatment options were discussed in detail.  Questions were invited and answered to the patient's satisfaction. I reviewed Primary care , and other specialty's notes to better coordinate patient's care.          Please be aware that this note has been generated with the assistance of [employer] voice-to-text.  Please excuse any spelling or grammatical errors.

## 2023-07-11 NOTE — PROGRESS NOTES
[facility]  History & Physical  Orthopedics    SUBJECTIVE:      COVID-19 attestation:  This patient was treated during the COVID-19 pandemic.  This was discussed with the patient, they are aware of our current policies and procedures, were given the option of delaying their visit and or switching to a virtual visit, delaying their surgery when applicable, and they elect to proceed.    Chief Complaint:   Chief Complaint   Patient presents with    Left Hand - Pain       Referring Provider: N/A     History of Present Illness:  Patient is a 42 y.o. left hand dominant male who presents today with complaints of Left hand laceration. [place] to brace his fall and grabbed a metal fence sustaining the laceration to his hand. He then presented to [facility] where it had been sutured repaired. He was given Keflex antibiotics and Tetanus. He initially complained of decreased movement in this thumb as well as pain. Xrs as noted below.      The patient denies any fevers, chills, N/V, D/C and presents for evaluation.       No past medical history on file.  No past surgical history on file.  Review of patient's allergies indicates:  No Known Allergies  Social History     Social History Narrative    Not on file     Family History   Problem Relation Age of Onset    Cancer Father     Heart disease Maternal Grandfather          Current Outpatient Medications:     acetaminophen-caff-dihydrocod 320.5-30-16 mg Cap, Trezix 320.5 mg-30 mg-16 mg capsule  Take 2 capsules every 6 hours by oral route for 7 days., Disp: , Rfl:     allopurinoL (ZYLOPRIM) 100 MG tablet, TAKE 2 TABLETS(200 MG) BY MOUTH EVERY DAY, Disp: 60 tablet, Rfl: 2    allopurinoL (ZYLOPRIM) 300 MG tablet, Take 1 tablet (300 mg total) by mouth once daily., Disp: 30 tablet, Rfl: 6    azithromycin (Z-TIANNA) 250 MG tablet, TAKE 2 TABLETS BY MOUTH EVERY DAY FOR 1 DAY THEN TAKE 1 TABLET BY MOUTH  EVERY DAY FOR 4 DAYS, Disp: , Rfl:     cephALEXin (KEFLEX) 500 MG capsule, Take 500 mg by mouth., Disp: , Rfl:     flu vac qs 2020,4 yr up,CD,PF, 60 mcg (15 mcg x 4)/0.5 mL Syrg, ADM 0.5ML IM UTD, Disp: , Rfl:     flu vacc qs 2019-20, 6 mos up, 60 mcg (15 mcg x 4)/0.5 mL Susp, , Disp: , Rfl:     fluticasone propionate (FLONASE) 50 mcg/actuation nasal spray, 2 sprays (100 mcg total) by Each Nostril route once daily., Disp: 16 g, Rfl: 2    HYDROcodone-acetaminophen (NORCO) 7.5-325 mg per tablet, Take 1 tablet by mouth every 6 (six) hours as needed., Disp: , Rfl:     hydrocodone-chlorpheniramine (TUSSIONEX) 10-8 mg/5 mL suspension, , Disp: , Rfl:     ibuprofen-famotidine (DUEXIS) 800-26.6 mg Tab, Duexis 800 mg-26.6 mg tablet  Take 1 tablet 3 times a day by oral route for 30 days., Disp: , Rfl:     ketoconazole (NIZORAL) 2 % cream, 1 application  once daily., Disp: , Rfl:     luliconazole 1 % Crea, APPLY A THIN LAYER TO THE TO THE AFFECTED AREA PLUS A 1 INCH MARGIN OF HEALTHY SURROUNDING SKIN, Disp: , Rfl:     methylPREDNISolone (MEDROL DOSEPACK) 4 mg tablet, FOLLOW PACKAGE DIRECTIONS, Disp: , Rfl:     montelukast (SINGULAIR) 10 mg tablet, , Disp: , Rfl:     oxyCODONE-acetaminophen (PERCOCET) 5-325 mg per tablet, Take 1 tablet by mouth every 6 (six) hours as needed., Disp: , Rfl:     colchicine (MITIGARE) 0.6 mg Cap, Take 1 capsule (0.6 mg total) by mouth once daily., Disp: 30 capsule, Rfl: 11    predniSONE (DELTASONE) 20 MG tablet, Take 1 tablet (20 mg total) by mouth once daily., Disp: 10 tablet, Rfl: 0    ROS    Review of Systems:  Constitutional: no fever or chills  Eyes: no visual changes  ENT: no nasal congestion or sore throat  Respiratory: no cough or shortness of breath  Cardiovascular: no chest pain  Gastrointestinal: no nausea or vomiting, tolerating diet  Musculoskeletal: pain and soreness    OBJECTIVE:      Vital Signs (Most Recent):  Vitals:    07/11/23 0917   BP: 124/80   Pulse: 87   Weight: 114.8 kg (253  "lb)   Height: 6' 3" (1.905 m)     Body mass index is 31.62 kg/m².    Physical Exam    Physical Exam:  Constitutional: The patient appears well-developed and well-nourished. No distress.   Head: Normocephalic and atraumatic.   Nose: Nose normal.   Eyes: Conjunctivae and EOM are normal.   Neck: No tracheal deviation present.   Cardiovascular: Normal rate and intact distal pulses.    Pulmonary/Chest: Effort normal. No respiratory distress.   Abdominal: There is no guarding.   Lymphatic: Negative for adenopathy   Neurological: The patient is alert.   Psychiatric: The patient has a normal mood and affect.     Cervical Exam:  ROM full, supple  Negative Spurling's  Negative Soliz's    Bilateral Hand/Wrist Examination:    Observation/Inspection:  Swelling  +over thenar eminence Left thumb   Deformity  none  Discoloration  none     Scars   none    Atrophy  none    HAND/WRIST EXAMINATION:    ROM hand/wrist/elbow full Right hand  Examination of Left hand ROM limited 2/2 swelling  Incision c/d/I, sutures intact, small amount of serosanguinous drainage.    Neurovascular Exam:  Digits WWP, brisk CR < 3s throughout  NVI motor/LTS to M/R/U nerves, radial pulse 2+  2+ biceps and brachioradialis reflexes    L hand appears to be grossly NVI however limited 2/2 swelling.     Diagnostic Results:  X-Ray Hand Complete Left  EXAMINATION:  XR HAND COMPLETE 3 VIEW LEFT    CLINICAL HISTORY:  Pain in left hand    FINDINGS:  Hand complete three views left: There is soft tissue gas a soft tissue defect and soft tissue swelling seen are soft tissues.  There may be 2 soft tissue foreign bodies.    Electronically signed by: Marlo Mayfield MD  Date:    07/11/2023  Time:    09:17     X-rays AP, lateral and oblique L hand taken today are independently reviewed by me and show soft tissue edema, ?foreign body vs sesamoid.     ASSESSMENT/PLAN:      42 y.o. yo male with Left hand laceration following a fall, s/p suture repair.     Plan:    Will plan to " follow up in 1 week.   Thumb spica splint  Continue to take Keflex was previously prescribed.  He knows to call sooner if any issues arise.     The patient's pathophysiology was explained in detail with reference to x-rays, models, other visual aids as appropriate.  Treatment options were discussed in detail.  Questions were invited and answered to the patient's satisfaction. I reviewed Primary care , and other specialty's notes to better coordinate patient's care.          Please be aware that this note has been generated with the assistance of [employer] voice-to-text.  Please excuse any spelling or grammatical errors.

## 2023-07-11 NOTE — PROGRESS NOTES
I have personally taken the history and examined this patient. I agree with the resident's note as stated above.   Patient has good flexion a little weak on opposition but he does have it abduction and abduction all normal the large laceration in the palm mild erythema but no drainage he was cleaned out at [facility] and sutured he received tetanus isn't on Keflex there is a small flap on his wound that I am concerned about the healing after discussion with the patient he does not want to do anything about it right now he does not feel he needs surgery for anything more aggressive sensations intact we will see him next week splint him for swelling for week and then sutures out in 2 weeks

## 2023-07-20 ENCOUNTER — TELEPHONE (OUTPATIENT)
Dept: ORTHOPEDICS | Facility: CLINIC | Age: 43
End: 2023-07-20
Payer: COMMERCIAL

## 2023-07-20 ENCOUNTER — OFFICE VISIT (OUTPATIENT)
Dept: ORTHOPEDICS | Facility: CLINIC | Age: 43
End: 2023-07-20
Payer: COMMERCIAL

## 2023-07-20 ENCOUNTER — PATIENT MESSAGE (OUTPATIENT)
Dept: ORTHOPEDICS | Facility: CLINIC | Age: 43
End: 2023-07-20

## 2023-07-20 ENCOUNTER — ANESTHESIA EVENT (OUTPATIENT)
Dept: SURGERY | Facility: HOSPITAL | Age: 43
End: 2023-07-20
Payer: COMMERCIAL

## 2023-07-20 VITALS
DIASTOLIC BLOOD PRESSURE: 77 MMHG | HEART RATE: 71 BPM | SYSTOLIC BLOOD PRESSURE: 112 MMHG | HEIGHT: 75 IN | BODY MASS INDEX: 31.46 KG/M2 | WEIGHT: 253 LBS

## 2023-07-20 DIAGNOSIS — T14.8XXD DELAYED WOUND HEALING: ICD-10-CM

## 2023-07-20 DIAGNOSIS — M79.642 LEFT HAND PAIN: Primary | ICD-10-CM

## 2023-07-20 PROCEDURE — 99999 PR PBB SHADOW E&M-EST. PATIENT-LVL V: CPT | Mod: PBBFAC,,, | Performed by: ORTHOPAEDIC SURGERY

## 2023-07-20 PROCEDURE — 99999 PR PBB SHADOW E&M-EST. PATIENT-LVL V: ICD-10-PCS | Mod: PBBFAC,,, | Performed by: ORTHOPAEDIC SURGERY

## 2023-07-20 PROCEDURE — 3078F DIAST BP <80 MM HG: CPT | Mod: CPTII,S$GLB,, | Performed by: ORTHOPAEDIC SURGERY

## 2023-07-20 PROCEDURE — 99214 PR OFFICE/OUTPT VISIT, EST, LEVL IV, 30-39 MIN: ICD-10-PCS | Mod: 57,S$GLB,, | Performed by: ORTHOPAEDIC SURGERY

## 2023-07-20 PROCEDURE — 3008F PR BODY MASS INDEX (BMI) DOCUMENTED: ICD-10-PCS | Mod: CPTII,S$GLB,, | Performed by: ORTHOPAEDIC SURGERY

## 2023-07-20 PROCEDURE — 99214 OFFICE O/P EST MOD 30 MIN: CPT | Mod: 57,S$GLB,, | Performed by: ORTHOPAEDIC SURGERY

## 2023-07-20 PROCEDURE — 3074F PR MOST RECENT SYSTOLIC BLOOD PRESSURE < 130 MM HG: ICD-10-PCS | Mod: CPTII,S$GLB,, | Performed by: ORTHOPAEDIC SURGERY

## 2023-07-20 PROCEDURE — 3008F BODY MASS INDEX DOCD: CPT | Mod: CPTII,S$GLB,, | Performed by: ORTHOPAEDIC SURGERY

## 2023-07-20 PROCEDURE — 3074F SYST BP LT 130 MM HG: CPT | Mod: CPTII,S$GLB,, | Performed by: ORTHOPAEDIC SURGERY

## 2023-07-20 PROCEDURE — 3078F PR MOST RECENT DIASTOLIC BLOOD PRESSURE < 80 MM HG: ICD-10-PCS | Mod: CPTII,S$GLB,, | Performed by: ORTHOPAEDIC SURGERY

## 2023-07-20 PROCEDURE — 3044F HG A1C LEVEL LT 7.0%: CPT | Mod: CPTII,S$GLB,, | Performed by: ORTHOPAEDIC SURGERY

## 2023-07-20 PROCEDURE — 3044F PR MOST RECENT HEMOGLOBIN A1C LEVEL <7.0%: ICD-10-PCS | Mod: CPTII,S$GLB,, | Performed by: ORTHOPAEDIC SURGERY

## 2023-07-20 RX ORDER — TRAMADOL HYDROCHLORIDE 50 MG/1
50 TABLET ORAL EVERY 6 HOURS PRN
Qty: 20 TABLET | Refills: 0 | Status: SHIPPED | OUTPATIENT
Start: 2023-07-20 | End: 2023-08-15

## 2023-07-20 RX ORDER — ACETAMINOPHEN 500 MG
1000 TABLET ORAL 2 TIMES DAILY
Qty: 28 TABLET | Refills: 0 | Status: SHIPPED | OUTPATIENT
Start: 2023-07-20 | End: 2023-08-03

## 2023-07-20 RX ORDER — IBUPROFEN 600 MG/1
600 TABLET ORAL EVERY 6 HOURS PRN
Qty: 28 TABLET | Refills: 0 | Status: SHIPPED | OUTPATIENT
Start: 2023-07-20 | End: 2023-09-08

## 2023-07-20 NOTE — TELEPHONE ENCOUNTER
LAURA hartman pt. Informed pt of 10:00 a.m. arrival time for 07/21/23 surgery at the [facility]. Reminded pt of NPO status & PO appt. requested a call back to the [facility] at [phone] with any questions, concerns or need for a different appointment time.

## 2023-07-20 NOTE — PRE-PROCEDURE INSTRUCTIONS
Chart reviewed, patient is ok to proceed to surgery at [facility]. Has H/O Gout and CHUCKY, uses CPAP.

## 2023-07-20 NOTE — PROGRESS NOTES
[facility]  History & Physical  Orthopedics    SUBJECTIVE:      Chief Complaint:   Chief Complaint   Patient presents with    Left Hand - Pain       Referring Provider: N/A     History of Present Illness:  Patient is a 42 y.o. left hand dominant male who presents today with complaints of Left hand laceration. [place] to brace his fall and grabbed a metal fence sustaining the laceration to his hand. He then presented to [facility] where it had been sutured repaired. He was given Keflex antibiotics and Tetanus. He initially complained of decreased movement in this thumb as well as pain. Xrs as noted below.      The patient denies any fevers, chills, N/V, D/C and presents for evaluation.    Interval History 7/20/23:  Patient returns today for repeat evaluation. Reports that his pain is controlled but worsened with ROM of the thumb and hand. Hand is tender over site of incision. Has been compliant with dressing precautions and Abx.        No past medical history on file.  No past surgical history on file.  Review of patient's allergies indicates:  No Known Allergies  Social History     Social History Narrative    Not on file     Family History   Problem Relation Age of Onset    Cancer Father     Heart disease Maternal Grandfather          Current Outpatient Medications:     acetaminophen-caff-dihydrocod 320.5-30-16 mg Cap, Trezix 320.5 mg-30 mg-16 mg capsule  Take 2 capsules every 6 hours by oral route for 7 days., Disp: , Rfl:     allopurinoL (ZYLOPRIM) 100 MG tablet, TAKE 2 TABLETS(200 MG) BY MOUTH EVERY DAY, Disp: 60 tablet, Rfl: 2    allopurinoL (ZYLOPRIM) 300 MG tablet, Take 1 tablet (300 mg total) by mouth once daily., Disp: 30 tablet, Rfl: 6    azithromycin (Z-TIANNA) 250 MG tablet, TAKE 2 TABLETS BY MOUTH EVERY DAY FOR 1 DAY THEN TAKE 1 TABLET BY MOUTH EVERY DAY FOR 4 DAYS, Disp: , Rfl:     flu vac qs 2020,4 yr up,CD,PF, 60  "mcg (15 mcg x 4)/0.5 mL Syrg, ADM 0.5ML IM UTD, Disp: , Rfl:     flu vacc qs 2019-20, 6 mos up, 60 mcg (15 mcg x 4)/0.5 mL Susp, , Disp: , Rfl:     fluticasone propionate (FLONASE) 50 mcg/actuation nasal spray, 2 sprays (100 mcg total) by Each Nostril route once daily., Disp: 16 g, Rfl: 2    HYDROcodone-acetaminophen (NORCO) 7.5-325 mg per tablet, Take 1 tablet by mouth every 6 (six) hours as needed., Disp: , Rfl:     hydrocodone-chlorpheniramine (TUSSIONEX) 10-8 mg/5 mL suspension, , Disp: , Rfl:     ibuprofen-famotidine (DUEXIS) 800-26.6 mg Tab, Duexis 800 mg-26.6 mg tablet  Take 1 tablet 3 times a day by oral route for 30 days., Disp: , Rfl:     ketoconazole (NIZORAL) 2 % cream, 1 application  once daily., Disp: , Rfl:     luliconazole 1 % Crea, APPLY A THIN LAYER TO THE TO THE AFFECTED AREA PLUS A 1 INCH MARGIN OF HEALTHY SURROUNDING SKIN, Disp: , Rfl:     methylPREDNISolone (MEDROL DOSEPACK) 4 mg tablet, FOLLOW PACKAGE DIRECTIONS, Disp: , Rfl:     montelukast (SINGULAIR) 10 mg tablet, , Disp: , Rfl:     colchicine (MITIGARE) 0.6 mg Cap, Take 1 capsule (0.6 mg total) by mouth once daily., Disp: 30 capsule, Rfl: 11    predniSONE (DELTASONE) 20 MG tablet, Take 1 tablet (20 mg total) by mouth once daily., Disp: 10 tablet, Rfl: 0    ROS    Review of Systems:  Constitutional: no fever or chills  Eyes: no visual changes  ENT: no nasal congestion or sore throat  Respiratory: no cough or shortness of breath  Cardiovascular: no chest pain  Gastrointestinal: no nausea or vomiting, tolerating diet  Musculoskeletal: pain and soreness    OBJECTIVE:      Vital Signs (Most Recent):  Vitals:    07/20/23 1048   BP: 112/77   Pulse: 71   Weight: 114.8 kg (253 lb)   Height: 6' 3" (1.905 m)     Body mass index is 31.62 kg/m².    Physical Exam    Physical Exam:  Constitutional: The patient appears well-developed and well-nourished. No distress.   Head: Normocephalic and atraumatic.   Nose: Nose normal.   Eyes: Conjunctivae and EOM " are normal.   Neck: No tracheal deviation present.   Cardiovascular: Normal rate and intact distal pulses.    Pulmonary/Chest: Effort normal. No respiratory distress.   Abdominal: There is no guarding.   Lymphatic: Negative for adenopathy   Neurological: The patient is alert.   Psychiatric: The patient has a normal mood and affect.     Cervical Exam:  ROM full, supple  Negative Spurling's  Negative Soliz's    Bilateral Hand/Wrist Examination:    Observation/Inspection:  Swelling  +over thenar eminence Left thumb   Deformity  none  Discoloration  none     Scars   none    Atrophy  none    HAND/WRIST EXAMINATION:    ROM hand/wrist/elbow full  Weeping drainage at center of thenar laceration with skin maceration  Thumb webspace incitation healing well, Incision without signs of erythema, edema, drainage, dehiscence, necrotic tissue, or infection.    Neurovascular Exam:  Digits WWP, brisk CR < 3s throughout  NVI motor/LTS to M/R/U nerves, radial pulse 2+  Thumb flexion, extension, abduction intact, limited opposition and adduction due to pain      Diagnostic Results:  X-Ray Hand Complete Left  EXAMINATION:  XR HAND COMPLETE 3 VIEW LEFT    CLINICAL HISTORY:  Pain in left hand    FINDINGS:  Hand complete three views left: There is soft tissue gas a soft tissue defect and soft tissue swelling seen are soft tissues.  There may be 2 soft tissue foreign bodies.    Electronically signed by: Marlo Mayfield MD  Date:    07/11/2023  Time:    09:17       ASSESSMENT/PLAN:      42 y.o. yo male with Left hand laceration following a fall, s/p suture repair 7/9/23.    Plan:    OR 7/21/23 for I&D of left hand due to poor wound healing  Risks, benefits, indications, and alternatives discussed. Consents signed in clinic.

## 2023-07-21 ENCOUNTER — HOSPITAL ENCOUNTER (OUTPATIENT)
Facility: HOSPITAL | Age: 43
Discharge: HOME | End: 2023-07-21
Attending: ORTHOPAEDIC SURGERY | Admitting: ORTHOPAEDIC SURGERY
Payer: COMMERCIAL

## 2023-07-21 ENCOUNTER — ANESTHESIA (OUTPATIENT)
Dept: SURGERY | Facility: HOSPITAL | Age: 43
End: 2023-07-21
Payer: COMMERCIAL

## 2023-07-21 VITALS
DIASTOLIC BLOOD PRESSURE: 88 MMHG | TEMPERATURE: 98 F | BODY MASS INDEX: 32.08 KG/M2 | SYSTOLIC BLOOD PRESSURE: 137 MMHG | RESPIRATION RATE: 20 BRPM | HEIGHT: 74 IN | HEART RATE: 76 BPM | OXYGEN SATURATION: 96 % | WEIGHT: 250 LBS

## 2023-07-21 DIAGNOSIS — T14.8XXD DELAYED WOUND HEALING: Primary | ICD-10-CM

## 2023-07-21 LAB
GRAM STN SPEC: NORMAL

## 2023-07-21 PROCEDURE — 71000033 HC RECOVERY, INTIAL HOUR: Performed by: ORTHOPAEDIC SURGERY

## 2023-07-21 PROCEDURE — 11426 PR EXC SKIN BENIG >4 CM REMAINDR BODY: ICD-10-PCS | Mod: LT,,, | Performed by: ORTHOPAEDIC SURGERY

## 2023-07-21 PROCEDURE — D9220A PRA ANESTHESIA: ICD-10-PCS | Mod: CRNA,,, | Performed by: NURSE ANESTHETIST, CERTIFIED REGISTERED

## 2023-07-21 PROCEDURE — 25000003 PHARM REV CODE 250: Performed by: ORTHOPAEDIC SURGERY

## 2023-07-21 PROCEDURE — 87116 MYCOBACTERIA CULTURE: CPT | Performed by: ORTHOPAEDIC SURGERY

## 2023-07-21 PROCEDURE — 63600175 PHARM REV CODE 636 W HCPCS: Performed by: STUDENT IN AN ORGANIZED HEALTH CARE EDUCATION/TRAINING PROGRAM

## 2023-07-21 PROCEDURE — 87206 SMEAR FLUORESCENT/ACID STAI: CPT | Performed by: ORTHOPAEDIC SURGERY

## 2023-07-21 PROCEDURE — 25000003 PHARM REV CODE 250: Performed by: NURSE ANESTHETIST, CERTIFIED REGISTERED

## 2023-07-21 PROCEDURE — 87075 CULTR BACTERIA EXCEPT BLOOD: CPT | Mod: 59 | Performed by: ORTHOPAEDIC SURGERY

## 2023-07-21 PROCEDURE — 25000003 PHARM REV CODE 250

## 2023-07-21 PROCEDURE — 63600175 PHARM REV CODE 636 W HCPCS

## 2023-07-21 PROCEDURE — D9220A PRA ANESTHESIA: ICD-10-PCS | Mod: ANES,,, | Performed by: STUDENT IN AN ORGANIZED HEALTH CARE EDUCATION/TRAINING PROGRAM

## 2023-07-21 PROCEDURE — 27200750 HC INSULATED NEEDLE/ STIMUPLEX: Performed by: STUDENT IN AN ORGANIZED HEALTH CARE EDUCATION/TRAINING PROGRAM

## 2023-07-21 PROCEDURE — D9220A PRA ANESTHESIA: Mod: ANES,,, | Performed by: STUDENT IN AN ORGANIZED HEALTH CARE EDUCATION/TRAINING PROGRAM

## 2023-07-21 PROCEDURE — 64415 SUPRACLAVICULAR SINGLE SHOT: ICD-10-PCS | Mod: 59,LT,, | Performed by: STUDENT IN AN ORGANIZED HEALTH CARE EDUCATION/TRAINING PROGRAM

## 2023-07-21 PROCEDURE — 99900035 HC TECH TIME PER 15 MIN (STAT)

## 2023-07-21 PROCEDURE — 87186 SC STD MICRODIL/AGAR DIL: CPT | Performed by: ORTHOPAEDIC SURGERY

## 2023-07-21 PROCEDURE — 87077 CULTURE AEROBIC IDENTIFY: CPT | Performed by: ORTHOPAEDIC SURGERY

## 2023-07-21 PROCEDURE — 87070 CULTURE OTHR SPECIMN AEROBIC: CPT | Performed by: ORTHOPAEDIC SURGERY

## 2023-07-21 PROCEDURE — 87015 SPECIMEN INFECT AGNT CONCNTJ: CPT | Mod: 59 | Performed by: ORTHOPAEDIC SURGERY

## 2023-07-21 PROCEDURE — 37000008 HC ANESTHESIA 1ST 15 MINUTES: Performed by: ORTHOPAEDIC SURGERY

## 2023-07-21 PROCEDURE — 71000015 HC POSTOP RECOV 1ST HR: Performed by: ORTHOPAEDIC SURGERY

## 2023-07-21 PROCEDURE — 36000704 HC OR TIME LEV I 1ST 15 MIN: Performed by: ORTHOPAEDIC SURGERY

## 2023-07-21 PROCEDURE — 63600175 PHARM REV CODE 636 W HCPCS: Performed by: NURSE ANESTHETIST, CERTIFIED REGISTERED

## 2023-07-21 PROCEDURE — 11426 EXC H-F-NK-SP B9+MARG >4 CM: CPT | Mod: LT,,, | Performed by: ORTHOPAEDIC SURGERY

## 2023-07-21 PROCEDURE — 64415 NJX AA&/STRD BRCH PLXS IMG: CPT | Mod: 59,LT | Performed by: STUDENT IN AN ORGANIZED HEALTH CARE EDUCATION/TRAINING PROGRAM

## 2023-07-21 PROCEDURE — 87205 SMEAR GRAM STAIN: CPT | Performed by: ORTHOPAEDIC SURGERY

## 2023-07-21 PROCEDURE — 25000003 PHARM REV CODE 250: Performed by: STUDENT IN AN ORGANIZED HEALTH CARE EDUCATION/TRAINING PROGRAM

## 2023-07-21 PROCEDURE — 27201423 OPTIME MED/SURG SUP & DEVICES STERILE SUPPLY: Performed by: ORTHOPAEDIC SURGERY

## 2023-07-21 PROCEDURE — 36000705 HC OR TIME LEV I EA ADD 15 MIN: Performed by: ORTHOPAEDIC SURGERY

## 2023-07-21 PROCEDURE — 94761 N-INVAS EAR/PLS OXIMETRY MLT: CPT

## 2023-07-21 PROCEDURE — 87102 FUNGUS ISOLATION CULTURE: CPT | Mod: 59 | Performed by: ORTHOPAEDIC SURGERY

## 2023-07-21 PROCEDURE — D9220A PRA ANESTHESIA: Mod: CRNA,,, | Performed by: NURSE ANESTHETIST, CERTIFIED REGISTERED

## 2023-07-21 PROCEDURE — 37000009 HC ANESTHESIA EA ADD 15 MINS: Performed by: ORTHOPAEDIC SURGERY

## 2023-07-21 RX ORDER — BACITRACIN ZINC 500 UNIT/G
OINTMENT (GRAM) TOPICAL
Status: DISCONTINUED | OUTPATIENT
Start: 2023-07-21 | End: 2023-07-21 | Stop reason: HOSPADM

## 2023-07-21 RX ORDER — ONDANSETRON 2 MG/ML
4 INJECTION INTRAMUSCULAR; INTRAVENOUS DAILY PRN
Status: DISCONTINUED | OUTPATIENT
Start: 2023-07-21 | End: 2023-07-21 | Stop reason: HOSPADM

## 2023-07-21 RX ORDER — MUPIROCIN 20 MG/G
OINTMENT TOPICAL
Status: DISCONTINUED | OUTPATIENT
Start: 2023-07-21 | End: 2023-07-21 | Stop reason: HOSPADM

## 2023-07-21 RX ORDER — HYDROMORPHONE HYDROCHLORIDE 1 MG/ML
0.2 INJECTION, SOLUTION INTRAMUSCULAR; INTRAVENOUS; SUBCUTANEOUS EVERY 5 MIN PRN
Status: DISCONTINUED | OUTPATIENT
Start: 2023-07-21 | End: 2023-07-21 | Stop reason: HOSPADM

## 2023-07-21 RX ORDER — CARBOXYMETHYLCELLULOSE SODIUM 10 MG/ML
GEL OPHTHALMIC
Status: DISCONTINUED | OUTPATIENT
Start: 2023-07-21 | End: 2023-07-21

## 2023-07-21 RX ORDER — MIDAZOLAM HYDROCHLORIDE 1 MG/ML
.5-4 INJECTION INTRAMUSCULAR; INTRAVENOUS
Status: DISCONTINUED | OUTPATIENT
Start: 2023-07-21 | End: 2023-07-21 | Stop reason: HOSPADM

## 2023-07-21 RX ORDER — FENTANYL CITRATE 50 UG/ML
25-200 INJECTION, SOLUTION INTRAMUSCULAR; INTRAVENOUS
Status: DISCONTINUED | OUTPATIENT
Start: 2023-07-21 | End: 2023-07-21 | Stop reason: HOSPADM

## 2023-07-21 RX ORDER — DEXAMETHASONE SODIUM PHOSPHATE 4 MG/ML
INJECTION, SOLUTION INTRA-ARTICULAR; INTRALESIONAL; INTRAMUSCULAR; INTRAVENOUS; SOFT TISSUE
Status: DISCONTINUED | OUTPATIENT
Start: 2023-07-21 | End: 2023-07-21

## 2023-07-21 RX ORDER — ACETAMINOPHEN 500 MG
1000 TABLET ORAL
Status: COMPLETED | OUTPATIENT
Start: 2023-07-21 | End: 2023-07-21

## 2023-07-21 RX ORDER — SODIUM CHLORIDE 9 MG/ML
INJECTION, SOLUTION INTRAVENOUS CONTINUOUS
Status: DISCONTINUED | OUTPATIENT
Start: 2023-07-21 | End: 2023-07-21 | Stop reason: HOSPADM

## 2023-07-21 RX ORDER — ONDANSETRON 2 MG/ML
INJECTION INTRAMUSCULAR; INTRAVENOUS
Status: DISCONTINUED | OUTPATIENT
Start: 2023-07-21 | End: 2023-07-21

## 2023-07-21 RX ORDER — FAMOTIDINE 10 MG/ML
INJECTION INTRAVENOUS
Status: DISCONTINUED | OUTPATIENT
Start: 2023-07-21 | End: 2023-07-21

## 2023-07-21 RX ORDER — PROPOFOL 10 MG/ML
VIAL (ML) INTRAVENOUS CONTINUOUS PRN
Status: DISCONTINUED | OUTPATIENT
Start: 2023-07-21 | End: 2023-07-21

## 2023-07-21 RX ORDER — PROPOFOL 10 MG/ML
VIAL (ML) INTRAVENOUS
Status: DISCONTINUED | OUTPATIENT
Start: 2023-07-21 | End: 2023-07-21

## 2023-07-21 RX ORDER — ROPIVACAINE HYDROCHLORIDE 5 MG/ML
INJECTION, SOLUTION EPIDURAL; INFILTRATION; PERINEURAL
Status: COMPLETED | OUTPATIENT
Start: 2023-07-21 | End: 2023-07-21

## 2023-07-21 RX ORDER — LIDOCAINE HYDROCHLORIDE 20 MG/ML
INJECTION INTRAVENOUS
Status: DISCONTINUED | OUTPATIENT
Start: 2023-07-21 | End: 2023-07-21

## 2023-07-21 RX ORDER — OXYCODONE HYDROCHLORIDE 5 MG/1
5 TABLET ORAL
Status: DISCONTINUED | OUTPATIENT
Start: 2023-07-21 | End: 2023-07-21 | Stop reason: HOSPADM

## 2023-07-21 RX ORDER — FENTANYL CITRATE 50 UG/ML
25 INJECTION, SOLUTION INTRAMUSCULAR; INTRAVENOUS EVERY 5 MIN PRN
Status: DISCONTINUED | OUTPATIENT
Start: 2023-07-21 | End: 2023-07-21 | Stop reason: HOSPADM

## 2023-07-21 RX ADMIN — SODIUM CHLORIDE, SODIUM GLUCONATE, SODIUM ACETATE, POTASSIUM CHLORIDE, MAGNESIUM CHLORIDE, SODIUM PHOSPHATE, DIBASIC, AND POTASSIUM PHOSPHATE: .53; .5; .37; .037; .03; .012; .00082 INJECTION, SOLUTION INTRAVENOUS at 01:07

## 2023-07-21 RX ADMIN — PROPOFOL 100 MCG/KG/MIN: 10 INJECTION, EMULSION INTRAVENOUS at 01:07

## 2023-07-21 RX ADMIN — CEFAZOLIN 2 G: 2 INJECTION, POWDER, FOR SOLUTION INTRAMUSCULAR; INTRAVENOUS at 01:07

## 2023-07-21 RX ADMIN — PROPOFOL 20 MG: 10 INJECTION, EMULSION INTRAVENOUS at 01:07

## 2023-07-21 RX ADMIN — FAMOTIDINE 20 MG: 10 INJECTION, SOLUTION INTRAVENOUS at 01:07

## 2023-07-21 RX ADMIN — SODIUM CHLORIDE: 0.9 INJECTION, SOLUTION INTRAVENOUS at 10:07

## 2023-07-21 RX ADMIN — MIDAZOLAM 2 MG: 1 INJECTION INTRAMUSCULAR; INTRAVENOUS at 12:07

## 2023-07-21 RX ADMIN — CARBOXYMETHYLCELLULOSE SODIUM 4 DROP: 10 GEL OPHTHALMIC at 01:07

## 2023-07-21 RX ADMIN — ONDANSETRON 4 MG: 2 INJECTION INTRAMUSCULAR; INTRAVENOUS at 01:07

## 2023-07-21 RX ADMIN — MUPIROCIN: 20 OINTMENT TOPICAL at 10:07

## 2023-07-21 RX ADMIN — ACETAMINOPHEN 1000 MG: 500 TABLET ORAL at 10:07

## 2023-07-21 RX ADMIN — FENTANYL CITRATE 100 MCG: 50 INJECTION INTRAMUSCULAR; INTRAVENOUS at 12:07

## 2023-07-21 RX ADMIN — ROPIVACAINE HYDROCHLORIDE 30 ML: 5 INJECTION EPIDURAL; INFILTRATION; PERINEURAL at 12:07

## 2023-07-21 RX ADMIN — LIDOCAINE HYDROCHLORIDE 60 MG: 20 INJECTION INTRAVENOUS at 01:07

## 2023-07-21 RX ADMIN — DEXAMETHASONE SODIUM PHOSPHATE 8 MG: 4 INJECTION, SOLUTION INTRAMUSCULAR; INTRAVENOUS at 01:07

## 2023-07-21 RX ADMIN — MIDAZOLAM 2 MG: 1 INJECTION INTRAMUSCULAR; INTRAVENOUS at 01:07

## 2023-07-21 NOTE — ANESTHESIA PREPROCEDURE EVALUATION
07/21/2023  Pre-operative evaluation for Procedure(s) (LRB):  INCISION AND DRAINAGE, HAND (Left)    Elliott Hernandez is a 42 y.o. male     Patient Active Problem List   Diagnosis    Sensorineural hearing loss of left ear    Excessive daytime sleepiness    Chronic fatigue    Sleep apnea    Delayed wound healing       Review of patient's allergies indicates:  No Known Allergies    No current facility-administered medications on file prior to encounter.     Current Outpatient Medications on File Prior to Encounter   Medication Sig Dispense Refill    acetaminophen-caff-dihydrocod 320.5-30-16 mg Cap Trezix 320.5 mg-30 mg-16 mg capsule   Take 2 capsules every 6 hours by oral route for 7 days.      allopurinoL (ZYLOPRIM) 100 MG tablet TAKE 2 TABLETS(200 MG) BY MOUTH EVERY DAY 60 tablet 2    allopurinoL (ZYLOPRIM) 300 MG tablet Take 1 tablet (300 mg total) by mouth once daily. 30 tablet 6    azithromycin (Z-TIANNA) 250 MG tablet TAKE 2 TABLETS BY MOUTH EVERY DAY FOR 1 DAY THEN TAKE 1 TABLET BY MOUTH EVERY DAY FOR 4 DAYS      flu vac qs 2020,4 yr up,CD,PF, 60 mcg (15 mcg x 4)/0.5 mL Syrg ADM 0.5ML IM UTD      flu vacc qs 2019-20, 6 mos up, 60 mcg (15 mcg x 4)/0.5 mL Susp       fluticasone propionate (FLONASE) 50 mcg/actuation nasal spray 2 sprays (100 mcg total) by Each Nostril route once daily. 16 g 2    HYDROcodone-acetaminophen (NORCO) 7.5-325 mg per tablet Take 1 tablet by mouth every 6 (six) hours as needed.      hydrocodone-chlorpheniramine (TUSSIONEX) 10-8 mg/5 mL suspension       ibuprofen-famotidine (DUEXIS) 800-26.6 mg Tab Duexis 800 mg-26.6 mg tablet   Take 1 tablet 3 times a day by oral route for 30 days.      ketoconazole (NIZORAL) 2 % cream 1 application  once daily.      luliconazole 1 % Crea APPLY A THIN LAYER TO THE TO THE AFFECTED AREA PLUS A 1 INCH MARGIN OF HEALTHY SURROUNDING  SKIN      methylPREDNISolone (MEDROL DOSEPACK) 4 mg tablet FOLLOW PACKAGE DIRECTIONS      montelukast (SINGULAIR) 10 mg tablet          No past surgical history on file.    Social History     Socioeconomic History    Marital status: Single   Tobacco Use    Smoking status: Former     Types: Cigarettes   Substance and Sexual Activity    Alcohol use: Yes     Alcohol/week: 6.0 standard drinks     Types: 3 Glasses of wine, 3 Cans of beer per week    Drug use: No         CBC: No results for input(s): WBC, RBC, HGB, HCT, PLT, MCV, MCH, MCHC in the last 72 hours.    CMP: No results for input(s): NA, K, CL, CO2, BUN, CREATININE, GLU, MG, PHOS, CALCIUM, ALBUMIN, PROT, ALKPHOS, ALT, AST, BILITOT in the last 72 hours.    INR  No results for input(s): PT, INR, PROTIME, APTT in the last 72 hours.        Diagnostic Studies:      EKD Echo:  No results found for this or any previous visit.      Pre-op Assessment    I have reviewed the Patient Summary Reports.     I have reviewed the Nursing Notes. I have reviewed the NPO Status.   I have reviewed the Medications.     Review of Systems  Pulmonary:   Sleep Apnea        Physical Exam  General: Well nourished and Cooperative    Airway:  Mallampati: II   Mouth Opening: Normal  TM Distance: Normal  Tongue: Normal  Neck ROM: Normal ROM    Chest/Lungs:  Clear to auscultation, Normal Respiratory Rate    Heart:  Rate: Normal  Rhythm: Regular Rhythm  Sounds: Normal        Anesthesia Plan  Type of Anesthesia, risks & benefits discussed:    Anesthesia Type: Gen Natural Airway  Intra-op Monitoring Plan: Standard ASA Monitors  Post Op Pain Control Plan: multimodal analgesia and IV/PO Opioids PRN  Induction:  IV  Airway Plan: Direct and Video, Post-Induction  Informed Consent: Informed consent signed with the Patient and all parties understand the risks and agree with anesthesia plan.  All questions answered.   ASA Score: 2    Ready For Surgery From Anesthesia Perspective.      .

## 2023-07-21 NOTE — TRANSFER OF CARE
"Anesthesia Transfer of Care Note    Patient: Elliott Hernandez    Procedure(s) Performed: Procedure(s) (LRB):  INCISION AND DRAINAGE, HAND (Left)    Patient location: PACU    Anesthesia Type: general and regional    Transport from OR: Transported from OR on room air with adequate spontaneous ventilation    Post pain: adequate analgesia    Post assessment: no apparent anesthetic complications and tolerated procedure well    Post vital signs: stable    Level of consciousness: awake    Nausea/Vomiting: no nausea/vomiting    Complications: none    Transfer of care protocol was followed      Last vitals:   Visit Vitals  BP (!) 145/94 (BP Location: Right arm, Patient Position: Lying)   Pulse 72   Temp 36.7 °C (98.1 °F) (Oral)   Resp 10   Ht 6' 2" (1.88 m)   Wt 113.4 kg (250 lb)   SpO2 97%   BMI 32.10 kg/m²     "

## 2023-07-21 NOTE — ANESTHESIA POSTPROCEDURE EVALUATION
Anesthesia Post Evaluation    Patient: Elliott Hernandez    Procedure(s) Performed: Procedure(s) (LRB):  INCISION AND DRAINAGE, HAND (Left)    Final Anesthesia Type: general      Patient location during evaluation: PACU  Patient participation: Yes- Able to Participate  Level of consciousness: awake and alert and oriented  Post-procedure vital signs: reviewed and stable  Pain management: adequate  Airway patency: patent  CHUCKY mitigation strategies: Multimodal analgesia  PONV status at discharge: No PONV  Anesthetic complications: no      Cardiovascular status: blood pressure returned to baseline and hemodynamically stable  Respiratory status: unassisted, spontaneous ventilation and room air  Hydration status: euvolemic  Follow-up not needed.          Vitals Value Taken Time   /88 07/21/23 1430   Temp 36.7 °C (98 °F) 07/21/23 1430   Pulse 76 07/21/23 1430   Resp 20 07/21/23 1430   SpO2 96 % 07/21/23 1430         Event Time   Out of Recovery 14:30:00         Pain/Reji Score: Pain Rating Prior to Med Admin: 0 (7/21/2023 12:16 PM)  Reji Score: 10 (7/21/2023  2:00 PM)

## 2023-07-21 NOTE — ANESTHESIA PROCEDURE NOTES
Supraclavicular single shot    Patient location during procedure: pre-op   Block not for primary anesthetic.  Reason for block: at surgeon's request and post-op pain management   Post-op Pain Location: left hand   Start time: 7/21/2023 12:21 PM  Timeout: 7/21/2023 12:20 PM   End time: 7/21/2023 12:24 PM    Staffing  Authorizing Provider: Nnamdi Merrill MD  Performing Provider: Nnamdi Merrill MD    Preanesthetic Checklist  Completed: patient identified, IV checked, site marked, risks and benefits discussed, surgical consent, monitors and equipment checked, pre-op evaluation and timeout performed  Peripheral Block  Patient position: supine  Prep: ChloraPrep  Patient monitoring: heart rate, cardiac monitor, continuous pulse ox, continuous capnometry and frequent blood pressure checks  Block type: supraclavicular  Laterality: left  Injection technique: single shot  Needle  Needle type: Stimuplex   Needle gauge: 22 G  Needle length: 2 in  Needle localization: anatomical landmarks and ultrasound guidance   -ultrasound image captured on disc.  Assessment  Injection assessment: negative aspiration, negative parasthesia and local visualized surrounding nerve  Paresthesia pain: none  Heart rate change: no  Slow fractionated injection: yes  Pain Tolerance: comfortable throughout block and no complaints  Medications:    Medications: ropivacaine (NAROPIN) injection 0.5% - Perineural   30 mL - 7/21/2023 12:22:00 PM    Additional Notes  VSS.  [facility] RN monitoring vitals throughout procedure.  Patient tolerated procedure well.

## 2023-07-21 NOTE — BRIEF OP NOTE
[place] - Surgery ([facility])  Brief Operative Note    Surgery Date: 7/21/2023     Surgeon(s) and Role:     * Ira Whitaker MD - Primary    Assisting Surgeon: None    Pre-op Diagnosis:  Left hand pain [M79.642]  Delayed wound healing [T14.8XXD]    Post-op Diagnosis:  Post-Op Diagnosis Codes:     * Left hand pain [M79.642]     * Delayed wound healing [T14.8XXD]    Procedure(s) (LRB):  INCISION AND DRAINAGE, HAND (Left)    Anesthesia: Regional    Operative Findings: See full op note    Estimated Blood Loss: Minimal         Specimens:   Specimen (24h ago, onward)      None              Discharge Note    OUTCOME: Patient tolerated treatment/procedure well without complication and is now ready for discharge.    DISPOSITION: Home or Self Care    FINAL DIAGNOSIS:  Delayed wound healing    FOLLOWUP: In clinic    DISCHARGE INSTRUCTIONS:    Discharge Procedure Orders   Notify your health care provider if you experience any of the following:  temperature >100.4     Notify your health care provider if you experience any of the following:  persistent nausea and vomiting or diarrhea     Notify your health care provider if you experience any of the following:  severe uncontrolled pain     Notify your health care provider if you experience any of the following:  redness, tenderness, or signs of infection (pain, swelling, redness, odor or green/yellow discharge around incision site)     Notify your health care provider if you experience any of the following:  difficulty breathing or increased cough     Notify your health care provider if you experience any of the following:  severe persistent headache     Notify your health care provider if you experience any of the following:  worsening rash     Notify your health care provider if you experience any of the following:  persistent dizziness, light-headedness, or visual disturbances     Notify your health care provider if you experience any of the following:  increased  confusion or weakness     Leave dressing on - Keep it clean, dry, and intact until clinic visit     Weight bearing restrictions (specify):   Order Comments: MARGARITA BRIDGES

## 2023-07-21 NOTE — INTERVAL H&P NOTE
The patient has been examined and the H&P has been reviewed:    I concur with the findings and no changes have occurred since H&P was written.    Surgery risks, benefits and alternative options discussed and understood by patient/family.          Active Hospital Problems    Diagnosis  POA    *Delayed wound healing [T14.8XXD]  Yes      Resolved Hospital Problems   No resolved problems to display.

## 2023-07-24 ENCOUNTER — PATIENT MESSAGE (OUTPATIENT)
Dept: ORTHOPEDICS | Facility: CLINIC | Age: 43
End: 2023-07-24
Payer: COMMERCIAL

## 2023-07-24 DIAGNOSIS — L08.9 INFECTED ABRASION OF LEFT HAND, INITIAL ENCOUNTER: Primary | ICD-10-CM

## 2023-07-24 DIAGNOSIS — S60.512A INFECTED ABRASION OF LEFT HAND, INITIAL ENCOUNTER: Primary | ICD-10-CM

## 2023-07-24 LAB
BACTERIA SPEC AEROBE CULT: ABNORMAL
BACTERIA SPEC AEROBE CULT: NORMAL

## 2023-07-25 ENCOUNTER — TELEPHONE (OUTPATIENT)
Dept: ORTHOPEDICS | Facility: CLINIC | Age: 43
End: 2023-07-25
Payer: COMMERCIAL

## 2023-07-25 DIAGNOSIS — M79.642 LEFT HAND PAIN: Primary | ICD-10-CM

## 2023-07-25 LAB
BACTERIA SPEC ANAEROBE CULT: NORMAL
BACTERIA SPEC ANAEROBE CULT: NORMAL

## 2023-07-25 RX ORDER — DOXYCYCLINE HYCLATE 100 MG
100 TABLET ORAL 2 TIMES DAILY
Qty: 14 TABLET | Refills: 0 | Status: SHIPPED | OUTPATIENT
Start: 2023-07-25 | End: 2023-08-15

## 2023-07-25 NOTE — TELEPHONE ENCOUNTER
Spoke c pt. Pt expressed his concerns over what he feels is a delay of care with scheduling at [facility]. Informed pt that I wasn't able to schedule an appointment and that they did adjust to what is likely their soonest available. Pt is scheduled to see them in two days, 07/27/23. Pt was informed that [facility] can be called in for him and notified that [facility] may change his course of treatment.  Pt was informed that an message would be sent to [facility] to verify this plan of care and his appt with our clinic 07/27/23 was verified.  Patient verbalized understanding and was thankful.

## 2023-07-25 NOTE — PROGRESS NOTES
Pt with culture + as below. Appt with [facility] is in 2 days. Will begin doxycycline and await [facility] recs if medication needs to be changed/ adjusted.       Aerobic Bacterial Culture  Abnormal   LECLERCIA ADECARBOXYLATA   Rare   Skin doug also present     Resulting Agency [facility]        Susceptibility     Leclercia adecarboxylata     CULTURE, AEROBIC  (SPECIFY SOURCE)     Amox/K Clav'ate <=8/4 mcg/mL Sensitive     Amp/Sulbactam <=8/4 mcg/mL Sensitive     Ampicillin <=8 mcg/mL Sensitive     Cefazolin <=2 mcg/mL Sensitive     Cefepime <=2 mcg/mL Sensitive     Ceftriaxone <=1 mcg/mL Sensitive     Ciprofloxacin <=1 mcg/mL Sensitive     Ertapenem <=0.5 mcg/mL Sensitive     Gentamicin <=4 mcg/mL Sensitive     Levofloxacin <=2 mcg/mL Sensitive     Meropenem <=1 mcg/mL Sensitive     Piperacillin/Tazo <=16 mcg/mL Sensitive     Tetracycline <=4 mcg/mL Sensitive     Tobramycin <=4 mcg/mL Sensitive     Trimeth/Sulfa <=2/38 mcg/mL Sensitive

## 2023-07-26 NOTE — OP NOTE
[facility] ([facility])  Surgery Department  Operative Note    SUMMARY     Date of Procedure: 7/21/2023     Procedure: Procedure(s) (LRB):  INCISION AND DRAINAGE, HAND (Left) wound revision scar 5 cm in length, splint application    Surgeon(s) and Role:     * Ira Whitaker MD - Primary    Assisting Surgeon:Shabbir MURILLO    Pre-Operative Diagnosis: Left hand pain [M79.642]  Delayed wound healing [T14.8XXD]    Post-Operative Diagnosis: Post-Op Diagnosis Codes:     * Left hand pain [M79.642]     * Delayed wound healing [T14.8XXD]    Anesthesia: Regional    Technical Procedures Used: surgery    Description of the Findings of the Procedure:  Indication for procedure Mr. Hernandez is a 42-year-old male who had a large laceration on the palm of his left hand from a fence [place] fence and lacerated his hand he was seen in clinic he had no significant defects were told the patient keep it clean and dry however he does a lot of outside work when he presented to clinic a week later he had a lot of wound drainage necessarily purulence but it was wet and he had the wound opening up most likely due to the wet environment of his splint after much discussion with the patient elected for surgical intervention risks and benefits were explained to the patient in clinic consents were signed in clinic     Procedure in detail the correct site was marked with the patient's participation the holding area the patient underwent regional anesthesia was brought to the operating placed in supine position underwent MAC anesthesia well-padded nonsterile tourniquet was placed on the left upper extremity left upper extremities prepped draped normal sterile fashion time-out was conducted for the correct procedure to be indicated IV antibiotics given patient preoperatively sutures were removal the arm was exsanguinated with an Esmarch just in the forearm not in the hand tourniquet was insufflated  250 mmHg the wound was opened up the areas irrigated copious amounts normal saline after cultures were taken the skin was ellipsed because it was then very poor condition most likely would not heal and the ellipsed skin measured a length of 5 cm was then passed off the back table after the area was irrigated copious amounts normal saline in the wound was explored which did not show defect in the tendon again preoperatively he was neurovascularly intact all tendons were working the wound was sutured with nylon sterile dressing was applied tourniquet was deflated brisk cap refill sued patient was placed in a well-padded splint tolerated suture was brought to cover room in stable condition    Postop plans patient keep the dressing clean dry intact will see the patient back in 1 week for wound examination sutures out at 2-3 weeks    Significant Surgical Tasks Conducted by the Assistant(s), if Applicable: retraction    Complications: No    Estimated Blood Loss (EBL): * No values recorded between 7/21/2023  1:22 PM and 7/21/2023  1:56 PM *           Implants: * No implants in log *    Specimens:   Specimen (24h ago, onward)      None                    Condition: Good    Disposition: PACU - hemodynamically stable.    Attestation: I performed the procedure.    Discharge Note    SUMMARY     Admit Date: 7/21/2023    Discharge Date and Time: 7/21/2023  2:58 PM    Hospital Course (synopsis of major diagnoses, care, treatment, and services provided during the course of the hospital stay): sureanita     Final Diagnosis: Post-Op Diagnosis Codes:     * Left hand pain [M79.642]     * Delayed wound healing [T14.8XXD]    Disposition: Home or Self Care    Follow Up/Patient Instructions:     Medications:  Reconciled Home Medications:      Medication List        START taking these medications      acetaminophen 500 MG tablet  Commonly known as: TYLENOL  Take 2 tablets (1,000 mg total) by mouth 2 (two) times a day. for 14 days      ibuprofen 600 MG tablet  Commonly known as: ADVIL,MOTRIN  Take 1 tablet (600 mg total) by mouth every 6 (six) hours as needed for Pain.     traMADoL 50 mg tablet  Commonly known as: ULTRAM  Take 1 tablet (50 mg total) by mouth every 6 (six) hours as needed for Pain.            CONTINUE taking these medications      acetaminophen-caff-dihydrocod 320.5-30-16 mg Cap  Trezix 320.5 mg-30 mg-16 mg capsule   Take 2 capsules every 6 hours by oral route for 7 days.     * allopurinoL 100 MG tablet  Commonly known as: ZYLOPRIM  TAKE 2 TABLETS(200 MG) BY MOUTH EVERY DAY     * allopurinoL 300 MG tablet  Commonly known as: ZYLOPRIM  Take 1 tablet (300 mg total) by mouth once daily.     azithromycin 250 MG tablet  Commonly known as: Z-TIANNA  TAKE 2 TABLETS BY MOUTH EVERY DAY FOR 1 DAY THEN TAKE 1 TABLET BY MOUTH EVERY DAY FOR 4 DAYS     flu vac qs 2020(4 yr up)CD(PF) 60 mcg (15 mcg x 4)/0.5 mL Syrg  ADM 0.5ML IM UTD     flu vacc qs 2019-20 (6 mos up) 60 mcg (15 mcg x 4)/0.5 mL Susp     fluticasone propionate 50 mcg/actuation nasal spray  Commonly known as: FLONASE  2 sprays (100 mcg total) by Each Nostril route once daily.     HYDROcodone-acetaminophen 7.5-325 mg per tablet  Commonly known as: NORCO  Take 1 tablet by mouth every 6 (six) hours as needed.     hydrocodone-chlorpheniramine 10-8 mg/5 mL suspension  Commonly known as: TUSSIONEX     ibuprofen-famotidine 800-26.6 mg Tab  Commonly known as: DUEXIS  Duexis 800 mg-26.6 mg tablet   Take 1 tablet 3 times a day by oral route for 30 days.     ketoconazole 2 % cream  Commonly known as: NIZORAL  1 application  once daily.     luliconazole 1 % Crea  APPLY A THIN LAYER TO THE TO THE AFFECTED AREA PLUS A 1 INCH MARGIN OF HEALTHY SURROUNDING SKIN     methylPREDNISolone 4 mg tablet  Commonly known as: MEDROL DOSEPACK  FOLLOW PACKAGE DIRECTIONS     montelukast 10 mg tablet  Commonly known as: SINGULAIR           * This list has 2 medication(s) that are the same as other medications  prescribed for you. Read the directions carefully, and ask your doctor or other care provider to review them with you.                Discharge Procedure Orders   Notify your health care provider if you experience any of the following:  temperature >100.4     Notify your health care provider if you experience any of the following:  persistent nausea and vomiting or diarrhea     Notify your health care provider if you experience any of the following:  severe uncontrolled pain     Notify your health care provider if you experience any of the following:  redness, tenderness, or signs of infection (pain, swelling, redness, odor or green/yellow discharge around incision site)     Notify your health care provider if you experience any of the following:  difficulty breathing or increased cough     Notify your health care provider if you experience any of the following:  severe persistent headache     Notify your health care provider if you experience any of the following:  worsening rash     Notify your health care provider if you experience any of the following:  persistent dizziness, light-headedness, or visual disturbances     Notify your health care provider if you experience any of the following:  increased confusion or weakness     Leave dressing on - Keep it clean, dry, and intact until clinic visit     Weight bearing restrictions (specify):   Order Comments: MARGARITA BRIDGES      Follow-up Information       Ira Whitaker MD Follow up in 2 week(s).    Specialties: Hand Surgery, Orthopedic Surgery  Why: For suture removal, For wound re-check  Contact information:  [address]  [facility] [address]  [facility]  [facility] [id number] [phone]

## 2023-07-27 ENCOUNTER — OFFICE VISIT (OUTPATIENT)
Dept: INFECTIOUS DISEASES | Facility: CLINIC | Age: 43
End: 2023-07-27
Payer: COMMERCIAL

## 2023-07-27 ENCOUNTER — LAB VISIT (OUTPATIENT)
Dept: LAB | Facility: HOSPITAL | Age: 43
End: 2023-07-27
Payer: COMMERCIAL

## 2023-07-27 ENCOUNTER — OFFICE VISIT (OUTPATIENT)
Dept: ORTHOPEDICS | Facility: CLINIC | Age: 43
End: 2023-07-27
Payer: COMMERCIAL

## 2023-07-27 VITALS
WEIGHT: 250 LBS | HEIGHT: 74 IN | TEMPERATURE: 99 F | BODY MASS INDEX: 32.08 KG/M2 | HEART RATE: 72 BPM | SYSTOLIC BLOOD PRESSURE: 134 MMHG | DIASTOLIC BLOOD PRESSURE: 96 MMHG

## 2023-07-27 VITALS
HEART RATE: 85 BPM | DIASTOLIC BLOOD PRESSURE: 89 MMHG | BODY MASS INDEX: 32.08 KG/M2 | SYSTOLIC BLOOD PRESSURE: 128 MMHG | WEIGHT: 250 LBS | HEIGHT: 74 IN

## 2023-07-27 DIAGNOSIS — Z98.890 POST-OPERATIVE STATE: Primary | ICD-10-CM

## 2023-07-27 DIAGNOSIS — S60.512A INFECTED ABRASION OF LEFT HAND, INITIAL ENCOUNTER: ICD-10-CM

## 2023-07-27 DIAGNOSIS — L08.9 INFECTED ABRASION OF LEFT HAND, INITIAL ENCOUNTER: ICD-10-CM

## 2023-07-27 LAB
ALBUMIN SERPL BCP-MCNC: 4.5 G/DL (ref 3.5–5.2)
ALP SERPL-CCNC: 63 U/L (ref 55–135)
ALT SERPL W/O P-5'-P-CCNC: 74 U/L (ref 10–44)
ANION GAP SERPL CALC-SCNC: 13 MMOL/L (ref 8–16)
AST SERPL-CCNC: 38 U/L (ref 10–40)
BASOPHILS # BLD AUTO: 0.05 K/UL (ref 0–0.2)
BASOPHILS NFR BLD: 0.7 % (ref 0–1.9)
BILIRUB SERPL-MCNC: 0.7 MG/DL (ref 0.1–1)
BUN SERPL-MCNC: 20 MG/DL (ref 6–20)
CALCIUM SERPL-MCNC: 9.6 MG/DL (ref 8.7–10.5)
CHLORIDE SERPL-SCNC: 105 MMOL/L (ref 95–110)
CO2 SERPL-SCNC: 23 MMOL/L (ref 23–29)
CREAT SERPL-MCNC: 1.2 MG/DL (ref 0.5–1.4)
CRP SERPL-MCNC: 3.3 MG/L (ref 0–8.2)
DIFFERENTIAL METHOD: NORMAL
EOSINOPHIL # BLD AUTO: 0.3 K/UL (ref 0–0.5)
EOSINOPHIL NFR BLD: 4.5 % (ref 0–8)
ERYTHROCYTE [DISTWIDTH] IN BLOOD BY AUTOMATED COUNT: 13.2 % (ref 11.5–14.5)
EST. GFR  (NO RACE VARIABLE): >60 ML/MIN/1.73 M^2
GLUCOSE SERPL-MCNC: 84 MG/DL (ref 70–110)
HCT VFR BLD AUTO: 50.5 % (ref 40–54)
HGB BLD-MCNC: 16.9 G/DL (ref 14–18)
IMM GRANULOCYTES # BLD AUTO: 0.02 K/UL (ref 0–0.04)
IMM GRANULOCYTES NFR BLD AUTO: 0.3 % (ref 0–0.5)
LYMPHOCYTES # BLD AUTO: 1.5 K/UL (ref 1–4.8)
LYMPHOCYTES NFR BLD: 22.3 % (ref 18–48)
MCH RBC QN AUTO: 28.6 PG (ref 27–31)
MCHC RBC AUTO-ENTMCNC: 33.5 G/DL (ref 32–36)
MCV RBC AUTO: 85 FL (ref 82–98)
MONOCYTES # BLD AUTO: 0.6 K/UL (ref 0.3–1)
MONOCYTES NFR BLD: 8 % (ref 4–15)
NEUTROPHILS # BLD AUTO: 4.4 K/UL (ref 1.8–7.7)
NEUTROPHILS NFR BLD: 64.2 % (ref 38–73)
NRBC BLD-RTO: 0 /100 WBC
PLATELET # BLD AUTO: 289 K/UL (ref 150–450)
PMV BLD AUTO: 9.3 FL (ref 9.2–12.9)
POTASSIUM SERPL-SCNC: 3.8 MMOL/L (ref 3.5–5.1)
PROT SERPL-MCNC: 8 G/DL (ref 6–8.4)
RBC # BLD AUTO: 5.91 M/UL (ref 4.6–6.2)
SODIUM SERPL-SCNC: 141 MMOL/L (ref 136–145)
WBC # BLD AUTO: 6.87 K/UL (ref 3.9–12.7)

## 2023-07-27 PROCEDURE — 1159F MED LIST DOCD IN RCRD: CPT | Mod: CPTII,S$GLB,, | Performed by: REGISTERED NURSE

## 2023-07-27 PROCEDURE — 3080F DIAST BP >= 90 MM HG: CPT | Mod: CPTII,S$GLB,, | Performed by: REGISTERED NURSE

## 2023-07-27 PROCEDURE — 3008F PR BODY MASS INDEX (BMI) DOCUMENTED: ICD-10-PCS | Mod: CPTII,S$GLB,, | Performed by: PHYSICIAN ASSISTANT

## 2023-07-27 PROCEDURE — 85025 COMPLETE CBC W/AUTO DIFF WBC: CPT | Performed by: REGISTERED NURSE

## 2023-07-27 PROCEDURE — 99999 PR PBB SHADOW E&M-EST. PATIENT-LVL IV: CPT | Mod: PBBFAC,,, | Performed by: REGISTERED NURSE

## 2023-07-27 PROCEDURE — 99999 PR PBB SHADOW E&M-EST. PATIENT-LVL V: ICD-10-PCS | Mod: PBBFAC,,, | Performed by: PHYSICIAN ASSISTANT

## 2023-07-27 PROCEDURE — 99203 OFFICE O/P NEW LOW 30 MIN: CPT | Mod: S$GLB,,, | Performed by: REGISTERED NURSE

## 2023-07-27 PROCEDURE — 36415 COLL VENOUS BLD VENIPUNCTURE: CPT | Performed by: REGISTERED NURSE

## 2023-07-27 PROCEDURE — 99203 PR OFFICE/OUTPT VISIT, NEW, LEVL III, 30-44 MIN: ICD-10-PCS | Mod: S$GLB,,, | Performed by: REGISTERED NURSE

## 2023-07-27 PROCEDURE — 3008F PR BODY MASS INDEX (BMI) DOCUMENTED: ICD-10-PCS | Mod: CPTII,S$GLB,, | Performed by: REGISTERED NURSE

## 2023-07-27 PROCEDURE — 80053 COMPREHEN METABOLIC PANEL: CPT | Performed by: REGISTERED NURSE

## 2023-07-27 PROCEDURE — 99999 PR PBB SHADOW E&M-EST. PATIENT-LVL V: CPT | Mod: PBBFAC,,, | Performed by: PHYSICIAN ASSISTANT

## 2023-07-27 PROCEDURE — 3080F PR MOST RECENT DIASTOLIC BLOOD PRESSURE >= 90 MM HG: ICD-10-PCS | Mod: CPTII,S$GLB,, | Performed by: REGISTERED NURSE

## 2023-07-27 PROCEDURE — 3075F SYST BP GE 130 - 139MM HG: CPT | Mod: CPTII,S$GLB,, | Performed by: REGISTERED NURSE

## 2023-07-27 PROCEDURE — 3079F DIAST BP 80-89 MM HG: CPT | Mod: CPTII,S$GLB,, | Performed by: PHYSICIAN ASSISTANT

## 2023-07-27 PROCEDURE — 3008F BODY MASS INDEX DOCD: CPT | Mod: CPTII,S$GLB,, | Performed by: PHYSICIAN ASSISTANT

## 2023-07-27 PROCEDURE — 3074F PR MOST RECENT SYSTOLIC BLOOD PRESSURE < 130 MM HG: ICD-10-PCS | Mod: CPTII,S$GLB,, | Performed by: PHYSICIAN ASSISTANT

## 2023-07-27 PROCEDURE — 3074F SYST BP LT 130 MM HG: CPT | Mod: CPTII,S$GLB,, | Performed by: PHYSICIAN ASSISTANT

## 2023-07-27 PROCEDURE — 3044F PR MOST RECENT HEMOGLOBIN A1C LEVEL <7.0%: ICD-10-PCS | Mod: CPTII,S$GLB,, | Performed by: REGISTERED NURSE

## 2023-07-27 PROCEDURE — 99024 POSTOP FOLLOW-UP VISIT: CPT | Mod: S$GLB,,, | Performed by: PHYSICIAN ASSISTANT

## 2023-07-27 PROCEDURE — 99999 PR PBB SHADOW E&M-EST. PATIENT-LVL IV: ICD-10-PCS | Mod: PBBFAC,,, | Performed by: REGISTERED NURSE

## 2023-07-27 PROCEDURE — 3079F PR MOST RECENT DIASTOLIC BLOOD PRESSURE 80-89 MM HG: ICD-10-PCS | Mod: CPTII,S$GLB,, | Performed by: PHYSICIAN ASSISTANT

## 2023-07-27 PROCEDURE — 99024 PR POST-OP FOLLOW-UP VISIT: ICD-10-PCS | Mod: S$GLB,,, | Performed by: PHYSICIAN ASSISTANT

## 2023-07-27 PROCEDURE — 1159F PR MEDICATION LIST DOCUMENTED IN MEDICAL RECORD: ICD-10-PCS | Mod: CPTII,S$GLB,, | Performed by: REGISTERED NURSE

## 2023-07-27 PROCEDURE — 86140 C-REACTIVE PROTEIN: CPT | Performed by: REGISTERED NURSE

## 2023-07-27 PROCEDURE — 3044F HG A1C LEVEL LT 7.0%: CPT | Mod: CPTII,S$GLB,, | Performed by: REGISTERED NURSE

## 2023-07-27 PROCEDURE — 3044F PR MOST RECENT HEMOGLOBIN A1C LEVEL <7.0%: ICD-10-PCS | Mod: CPTII,S$GLB,, | Performed by: PHYSICIAN ASSISTANT

## 2023-07-27 PROCEDURE — 1159F MED LIST DOCD IN RCRD: CPT | Mod: CPTII,S$GLB,, | Performed by: PHYSICIAN ASSISTANT

## 2023-07-27 PROCEDURE — 1159F PR MEDICATION LIST DOCUMENTED IN MEDICAL RECORD: ICD-10-PCS | Mod: CPTII,S$GLB,, | Performed by: PHYSICIAN ASSISTANT

## 2023-07-27 PROCEDURE — 3044F HG A1C LEVEL LT 7.0%: CPT | Mod: CPTII,S$GLB,, | Performed by: PHYSICIAN ASSISTANT

## 2023-07-27 PROCEDURE — 3008F BODY MASS INDEX DOCD: CPT | Mod: CPTII,S$GLB,, | Performed by: REGISTERED NURSE

## 2023-07-27 PROCEDURE — 3075F PR MOST RECENT SYSTOLIC BLOOD PRESS GE 130-139MM HG: ICD-10-PCS | Mod: CPTII,S$GLB,, | Performed by: REGISTERED NURSE

## 2023-07-27 RX ORDER — PREDNISONE 5 MG/1
1 TABLET, DELAYED RELEASE ORAL NIGHTLY
COMMUNITY
End: 2023-09-08

## 2023-07-27 RX ORDER — COLCHICINE 0.6 MG/1
TABLET ORAL
COMMUNITY
Start: 2023-06-13 | End: 2023-09-08

## 2023-07-27 RX ORDER — COLCHICINE 0.6 MG/1
TABLET ORAL
COMMUNITY
End: 2023-09-08

## 2023-07-27 RX ORDER — LEVOFLOXACIN 750 MG/1
750 TABLET ORAL DAILY
Qty: 14 TABLET | Refills: 0 | Status: SHIPPED | OUTPATIENT
Start: 2023-07-27 | End: 2023-08-10

## 2023-07-27 RX ORDER — DOXYCYCLINE 100 MG/1
100 CAPSULE ORAL 2 TIMES DAILY
Qty: 14 CAPSULE | Refills: 0 | Status: SHIPPED | OUTPATIENT
Start: 2023-07-27 | End: 2023-08-03

## 2023-07-27 NOTE — PROGRESS NOTES
"Mr. Hernandez is here today for a post-operative visit.  He is 6 days status post left hand incision and drainage with scar revision by Dr. Whitaker on 7/21/23. He reports that he is doing well overall. Pain is minimal. Cultures as below, has appt with [facility] today.  He denies fever, chills, and sweats since the time of the surgery.     Physical exam:    Vitals:    07/27/23 0949   BP: 128/89   Pulse: 85   Weight: 113.4 kg (250 lb)   Height: 6' 2" (1.88 m)   PainSc:   1   PainLoc: Hand     Vital signs are stable, patient is afebrile.  Patient is well dressed and well groomed, no acute distress.  Alert and oriented to person, place, and time.  Post op dressing taken down.  Incision is clean, dry and intact, sutures in place. No drainage. There is no erythema or exudate.  There is no sign of any infection. He is NVI.                 Cultures   Aerobic Bacterial Culture  Abnormal   LECLERCIA ADECARBOXYLATA   Rare   Skin doug also present     Resulting Agency [facility]        Susceptibility     Leclercia adecarboxylata     CULTURE, AEROBIC  (SPECIFY SOURCE)     Amox/K Clav'ate <=8/4 mcg/mL Sensitive     Amp/Sulbactam <=8/4 mcg/mL Sensitive     Ampicillin <=8 mcg/mL Sensitive     Cefazolin <=2 mcg/mL Sensitive     Cefepime <=2 mcg/mL Sensitive     Ceftriaxone <=1 mcg/mL Sensitive     Ciprofloxacin <=1 mcg/mL Sensitive     Ertapenem <=0.5 mcg/mL Sensitive     Gentamicin <=4 mcg/mL Sensitive     Levofloxacin <=2 mcg/mL Sensitive     Meropenem <=1 mcg/mL Sensitive     Piperacillin/Tazo <=16 mcg/mL Sensitive     Tetracycline <=4 mcg/mL Sensitive     Tobramycin <=4 mcg/mL Sensitive     Trimeth/Sulfa <=2/38 mcg/mL Sensitive                 Assessment:  status post left hand incision and drainage with scar revision by Dr. Whitaker on 7/21/23    Plan:  Diagnoses and all orders for this visit:    Post-operative state  -     Ambulatory referral/consult to Physical/Occupational Therapy; Future        PO instruction reviewed and " provided to patient  New soft dressing with volar slab orthoglass/ fiberglass splint applied. To keep in place full time but may be removed then reapplied by infectious disease if needed  Therapy ordered to begin at 2 wk PO   RTC next week

## 2023-07-27 NOTE — PROGRESS NOTES
Subjective     Patient ID: Elliott Hernandez is a 42 y.o. male.    Chief Complaint: hand infection     HPI      Mr. Hernandez is a 42 year old male with a PMH of left ear hearing loss, sleep apnea, who presents following left hand laceration. [place] and [place], he reached to a metal fence to brace his fall. Following injury, he presented to [facility] in which he was sutured, and was given a tetanus booster as well as keflex abx rx. Pt then presented to Dr. Josue villalobos with ortho hand surgery for follow up. Pt is s/p I&D with Josue villalobos on 7/21/23 for poor wound healing. Intra op cultures + leclercia adecarboxylata, tetracycline S. Pt was started on doxycycline post op. Pt is tolerating abx okay. Denies fever, body aches, chills. Denies NVD. States he is traveling to [place], [place] this weekend via airplane.       Review of Systems   Constitutional:  Negative for chills, diaphoresis, fatigue and fever.   HENT: Negative.     Respiratory:  Negative for cough and shortness of breath.    Cardiovascular:  Negative for chest pain, palpitations and leg swelling.   Gastrointestinal:  Negative for diarrhea, nausea and vomiting.   Genitourinary:  Negative for difficulty urinating.   Musculoskeletal:  Positive for myalgias.   Integumentary:  Positive for wound. Negative for rash.   Neurological:  Negative for dizziness and weakness.   Psychiatric/Behavioral:  Negative for agitation and confusion.           Objective     Physical Exam  Vitals reviewed.   Constitutional:       General: He is not in acute distress.     Appearance: Normal appearance. He is not ill-appearing.   HENT:      Head: Normocephalic.      Nose: Nose normal.      Mouth/Throat:      Mouth: Mucous membranes are moist.      Pharynx: Oropharynx is clear.   Eyes:      General:         Right eye: No discharge.         Left eye: No discharge.      Conjunctiva/sclera: Conjunctivae normal.   Cardiovascular:      Rate  and Rhythm: Normal rate and regular rhythm.   Pulmonary:      Effort: Pulmonary effort is normal. No respiratory distress.      Breath sounds: No stridor.   Abdominal:      General: Abdomen is flat. There is no distension.      Palpations: Abdomen is soft.   Musculoskeletal:         General: Normal range of motion.      Cervical back: Normal range of motion.      Right lower leg: No edema.      Left lower leg: No edema.   Skin:     Findings: Erythema and lesion present. No rash.   Neurological:      General: No focal deficit present.      Mental Status: He is alert and oriented to person, place, and time.      Motor: No weakness.      Gait: Gait normal.   Psychiatric:         Mood and Affect: Mood normal.         Behavior: Behavior normal.         Thought Content: Thought content normal.         Judgment: Judgment normal.            Assessment and Plan     1. Infected abrasion of left hand, initial encounter  -     Ambulatory referral/consult to Infectious Disease      -Will start levaquin 750 mg PO q24 hr in addition to doxycycline 100 mg PO BID x14d for SSTI  - labs today - CBC, CMP, CRP  - Will follow up on 8/10/23 on 0900  - Call or seek immediate medical attention for any fevers, chills, sweats, diarrhea, n/v or increase in pain, swelling, drainage from wound.         30 minutes of total time was spent on this encounter, which includes face to face time and non-face to face time preparing to see the patient (eg, review of tests), Obtaining and/or reviewing separately obtained history, documenting clinical information in the electronic or other health record, independently interpreting results (not separately reported) and communicating results to the patient/family/caregiver, or care coordination (not separately reported).

## 2023-08-01 ENCOUNTER — PATIENT MESSAGE (OUTPATIENT)
Dept: RHEUMATOLOGY | Facility: CLINIC | Age: 43
End: 2023-08-01
Payer: COMMERCIAL

## 2023-08-01 RX ORDER — ALLOPURINOL 300 MG/1
300 TABLET ORAL DAILY
Qty: 30 TABLET | Refills: 6 | Status: SHIPPED | OUTPATIENT
Start: 2023-08-01 | End: 2024-01-22

## 2023-08-01 RX ORDER — ALLOPURINOL 100 MG/1
150 TABLET ORAL DAILY
Qty: 60 TABLET | Refills: 4 | Status: SHIPPED | OUTPATIENT
Start: 2023-08-01 | End: 2024-01-22

## 2023-08-01 RX ORDER — ALLOPURINOL 100 MG/1
200 TABLET ORAL DAILY
Qty: 60 TABLET | Refills: 2 | OUTPATIENT
Start: 2023-08-01

## 2023-08-01 RX ORDER — ALLOPURINOL 300 MG/1
300 TABLET ORAL DAILY
Qty: 30 TABLET | Refills: 6 | OUTPATIENT
Start: 2023-08-01

## 2023-08-03 ENCOUNTER — TELEPHONE (OUTPATIENT)
Dept: ORTHOPEDICS | Facility: CLINIC | Age: 43
End: 2023-08-03
Payer: COMMERCIAL

## 2023-08-04 ENCOUNTER — OFFICE VISIT (OUTPATIENT)
Dept: ORTHOPEDICS | Facility: CLINIC | Age: 43
End: 2023-08-04
Payer: COMMERCIAL

## 2023-08-04 VITALS — WEIGHT: 250 LBS | BODY MASS INDEX: 32.08 KG/M2 | HEIGHT: 74 IN

## 2023-08-04 DIAGNOSIS — Z98.890 POST-OPERATIVE STATE: Primary | ICD-10-CM

## 2023-08-04 PROCEDURE — 3008F BODY MASS INDEX DOCD: CPT | Mod: CPTII,S$GLB,, | Performed by: PHYSICIAN ASSISTANT

## 2023-08-04 PROCEDURE — 1159F MED LIST DOCD IN RCRD: CPT | Mod: CPTII,S$GLB,, | Performed by: PHYSICIAN ASSISTANT

## 2023-08-04 PROCEDURE — 99999 PR PBB SHADOW E&M-EST. PATIENT-LVL III: CPT | Mod: PBBFAC,,, | Performed by: PHYSICIAN ASSISTANT

## 2023-08-04 PROCEDURE — 3044F HG A1C LEVEL LT 7.0%: CPT | Mod: CPTII,S$GLB,, | Performed by: PHYSICIAN ASSISTANT

## 2023-08-04 PROCEDURE — 3008F PR BODY MASS INDEX (BMI) DOCUMENTED: ICD-10-PCS | Mod: CPTII,S$GLB,, | Performed by: PHYSICIAN ASSISTANT

## 2023-08-04 PROCEDURE — 1159F PR MEDICATION LIST DOCUMENTED IN MEDICAL RECORD: ICD-10-PCS | Mod: CPTII,S$GLB,, | Performed by: PHYSICIAN ASSISTANT

## 2023-08-04 PROCEDURE — 99999 PR PBB SHADOW E&M-EST. PATIENT-LVL III: ICD-10-PCS | Mod: PBBFAC,,, | Performed by: PHYSICIAN ASSISTANT

## 2023-08-04 PROCEDURE — 99024 POSTOP FOLLOW-UP VISIT: CPT | Mod: S$GLB,,, | Performed by: PHYSICIAN ASSISTANT

## 2023-08-04 PROCEDURE — 99024 PR POST-OP FOLLOW-UP VISIT: ICD-10-PCS | Mod: S$GLB,,, | Performed by: PHYSICIAN ASSISTANT

## 2023-08-04 PROCEDURE — 3044F PR MOST RECENT HEMOGLOBIN A1C LEVEL <7.0%: ICD-10-PCS | Mod: CPTII,S$GLB,, | Performed by: PHYSICIAN ASSISTANT

## 2023-08-04 NOTE — PROGRESS NOTES
"Mr. Hernandez is here today for a post-operative visit.  He is 14 days status post left hand incision and drainage with scar revision by Dr. Whitaker on 7/21/23. He reports that he is doing well overall. Pain is minimal. Cultures as below. He has been seen by infectious disease and is on oral abx, reports compliance. He denies fever, chills, and sweats since the time of the surgery.     Physical exam:    Vitals:    08/04/23 0913   Weight: 113.4 kg (250 lb)   Height: 6' 2" (1.88 m)   PainSc:   3       Vital signs are stable, patient is afebrile.  Patient is well dressed and well groomed, no acute distress.  Alert and oriented to person, place, and time.  dressing taken down.  Incision is clean, dry and intact, sutures in place. No drainage. There is no erythema or exudate.  There is no sign of any infection. He is NVI. Sutures removed without difficulty. Overall good motion.      Cultures   Aerobic Bacterial Culture  Abnormal   LECLERCIA ADECARBOXYLATA   Rare   Skin doug also present     Resulting Agency [facility]        Susceptibility     Leclercia adecarboxylata     CULTURE, AEROBIC  (SPECIFY SOURCE)     Amox/K Clav'ate <=8/4 mcg/mL Sensitive     Amp/Sulbactam <=8/4 mcg/mL Sensitive     Ampicillin <=8 mcg/mL Sensitive     Cefazolin <=2 mcg/mL Sensitive     Cefepime <=2 mcg/mL Sensitive     Ceftriaxone <=1 mcg/mL Sensitive     Ciprofloxacin <=1 mcg/mL Sensitive     Ertapenem <=0.5 mcg/mL Sensitive     Gentamicin <=4 mcg/mL Sensitive     Levofloxacin <=2 mcg/mL Sensitive     Meropenem <=1 mcg/mL Sensitive     Piperacillin/Tazo <=16 mcg/mL Sensitive     Tetracycline <=4 mcg/mL Sensitive     Tobramycin <=4 mcg/mL Sensitive     Trimeth/Sulfa <=2/38 mcg/mL Sensitive                 Assessment:  status post left hand incision and drainage with scar revision by Dr. Whitaker on 7/21/23    Plan:  Elliott was seen today for post-op evaluation.    Diagnoses and all orders for this visit:    Post-operative state    PO " instruction reviewed and provided to patient  Continue abx per ID   Therapy scheduled to begin 8/8  RTC 4 wks

## 2023-08-08 ENCOUNTER — CLINICAL SUPPORT (OUTPATIENT)
Dept: REHABILITATION | Facility: HOSPITAL | Age: 43
End: 2023-08-08
Payer: COMMERCIAL

## 2023-08-08 DIAGNOSIS — M79.642 LEFT HAND PAIN: ICD-10-CM

## 2023-08-08 DIAGNOSIS — R29.898 LEFT HAND WEAKNESS: ICD-10-CM

## 2023-08-08 DIAGNOSIS — Z98.890 POST-OPERATIVE STATE: ICD-10-CM

## 2023-08-08 PROCEDURE — 97530 THERAPEUTIC ACTIVITIES: CPT | Mod: PO

## 2023-08-08 PROCEDURE — 97165 OT EVAL LOW COMPLEX 30 MIN: CPT | Mod: PO

## 2023-08-08 NOTE — PATIENT INSTRUCTIONS
"[facility], OCCUPATIONAL THERAPY  HOME EXERCISE PROGRAM                                                           Massage the scars in a circular motion, lengthways, crossways.    5 mins        AROM: Wrist Flexion / Extension               Bend your wrist forward and back as far as possible.          AROM: Wrist Radial / Ulnar Deviation  Bend your wrist from side to side as far as possible.      AROM: Composite Flexion / Extension ("Full Fist")  Bend every joint in your hand into a fist. Hold 3 seconds.   Straighten your fingers.           AROM: Thumb IP Flexion / Extension  Brace thumb below tip joint. Bend joint as far as   possible then straighten.      AROM: Composite Flexion   Bend both joints of thumb as far as possible.   Try to touch base of little finger.                            AROM: Opposition   Touch tip of thumb to nail tip of each  finger in turn, making an "O" shape.      AROM: Composite Movement Circumduction  Make clockwise circles with thumb. Reverse and make counterclockwise   circles   with thumb.                                    AROM: Composite Flesion ("Pinky Slides")  Touch thumb to tip of small finger. Slide thumb down   small finger into palm.         AROM: MP Extension   With palm on table, lift thumb up.   Hold 3 seconds. Relax and lower thumb.          Therapist: MANPREET Lee/KAREN        "

## 2023-08-09 PROBLEM — M79.642 LEFT HAND PAIN: Status: ACTIVE | Noted: 2023-08-09

## 2023-08-09 PROBLEM — R29.898 LEFT HAND WEAKNESS: Status: ACTIVE | Noted: 2023-08-09

## 2023-08-09 NOTE — PLAN OF CARE
"  [facility] Occupational Therapy  Initial Evaluation     Date: 8/8/2023  Patient: Elliott Hernandez  Chart Number: [id number]  Referring Physician: Maribell Holt PA-C  Therapy Diagnosis:   1. Post-operative state  Ambulatory referral/consult to Physical/Occupational Therapy      2. Left hand pain        3. Left hand weakness            Physician Orders: Eval and treat - "status post left hand incision and drainage with scar revision by Dr. Whitaker on 7/21/23  2x/wk x 6 wk"  Medical Diagnosis: Post-operative state [Z98.890]  Evaluation Date: 8/8/2023  Plan of Care Certification Date: 11/3/23  Authorization Period: 7/27/23 - 12/31/23  Surgery Date and Procedure:  7/21/23  INCISION AND DRAINAGE, HAND (Left) wound revision scar 5 cm in length, splint application  Date of Return to MD: 9/5/23  FOTO Completion:  NEEDS FOTO NEXT VISIT     Visit #: 1 of 1  Time In: 1:45 pm   Time Out: 2:30 pm     Total Billable Time: 45 mins     Precautions: Standard, bacterial infection (no heat)    Subjective     History of Current Condition: He reports an injury to the left hand on 7/9/23 where he slipped and cut his hand on a metal fence. He visited the ED where he was sutured and given Keflex antibiotics and Tetanus. He underwent a I&D and wound revision of left hand by Dr. Whitaker on 7/21/23. Culteres collected in therapy yielded "LECLERCIA ADECARBOXYLATA" and he has been followed by infectious disease. F/U visit with Maribell NEWTON on 8/4/23 for suture removal. He presents to OT today for initial evaluation. He reports he's finished taking his antibiotics. Main c/o sensitivity and scar tissue. Itchy and bothersome at scar site.     Falls: yes    Involved Side: left   Dominant Side: left   Date of Onset: DOS 7/21/23  Imaging: x-ray   Previous Therapy: no     Patient's Goals for Therapy: hold objects without scar tissue bothering     Pain:  Functional Pain Scale Rating 0-10:   0/10 on average  0/10 at best  2/10 " at worst  0/10 current   Location: scar site   Description: thenar   Aggravating Factors: increased activity   Easing Factors: advil       Occupation:     Working presently: full time   Duties: build houses,       Functional Limitations/Social History:    Prior Level of Function: I   Current Level of Function: I     Home/Living environment: help at home   DME: nothing   Leisure: travel, fish   Driving: Y      Past Medical History/Physical Systems Review:   Elliott Hernandez  has a past medical history of Sleep apnea.    Elliott Hernanedz  has a past surgical history that includes LASIK (Bilateral); [place] tooth extraction; and Incision and drainage of hand (Left, 7/21/2023).    Elliott has a current medication list which includes the following prescription(s): acetaminophen-caff-dihydrocod, allopurinol, allopurinol, azithromycin, colchicine, colchicine, doxycycline, flu vac qs 2020(4 yr up)cd(pf), flu vacc qs 2019-20 (6 mos up), fluticasone propionate, hydrocodone-acetaminophen, hydrocodone-chlorpheniramine, ibuprofen, ibuprofen-famotidine, ketoconazole, levofloxacin, luliconazole, methylprednisolone, montelukast, enoc, and tramadol.    Review of patient's allergies indicates:  No Known Allergies       Objective     Mental status: alert, oriented x3    Observation:   Hypertrophic scarring. One small suture fragment remains.       Wound Assessment:   7.5 thenar   2 cm    Edema: Circumferential measurements: In centimters     8/8/2023 8/8/2023    Left Right   Thumb:     Prox. Phalanx 7 6.7      Left  8/8/2023 Right   8/8/2023   MPs 23.4 cm 24.2 cm         Range of Motion:     Elbow and Wrist ROM. Measured in degrees.   8/9/2023 8/9/2023    Left  Right    Wrist Ext/Flex 65 / 60 60 / 65   Wrist RD/UD nt nt       Hand ROM. Measured in degrees.   8/9/2023 8/9/2023    Left  Right         Thumb: MP 30 50                IP 73 78       Rad ADD/ABD 65 60       Pal ADD/ABD 50 50           Opposition To base of sf To dpc        Strength: (JACKSON Dynamometer in psi.)      8/8/2023 8/8/2023    Left Right   Rung II nt 100       Pinch Strength (Measured in psi)     8/8/2023 8/8/2023    Left Right   Key Pinch nt  21    3pt Pinch nt  15    2pt Pinch nt  13.5        Limitation/Restriction for FOTO Survey    Therapist reviewed FOTO scores for Elliott Hernandez on 8/8/2023.   FOTO documents entered into [employer] - see Media section.    Limitation Score: needs foto next visit          Treatment     Treatment Time In: 2:05 pm   Treatment Time Out: 2:30 pm   Total Treatment time separate from Evaluation time:25 mins       Bucky participated in dynamic functional therapeutic activities to improve functional performance for 25  minutes, including:  - Introduced home exercise program and checked for correct performance  - Introduced and instructed on scar massage - checked for correct performance  - Discussed principles of desensitization - use different fabrics/textures on hypersensitive scars   - Review precautions - no heavy  or pinch       Patient Education and Home Exercises     Education provided:   - Role of OT, HEP, and POC    Written Home Exercises Provided: yes.  Exercises were reviewed and Bucky was able to demonstrate them prior to the end of the session.  Bucky demonstrated good  understanding of the education provided. See EMR under Patient Instructions for exercises provided during therapy sessions.     Pt was advised to perform these exercises free of pain, and to stop performing them if pain occurs.    Patient/Family Education: role of OT, goals for OT, scheduling/cancellations - pt verbalized understanding. Discussed insurance limitations with patient.      Assessment     Elliott Hernandez is a 42 y.o. male referred to outpatient occupational therapy and presents with the following therapy deficits: Decreased functional hand use, Increased pain, and Joint Stiffness and demonstrates  limitations as described in the chart below. Following medical record review it is determined that pt will benefit from occupational therapy services in order to maximize pain free and/or functional use of left hand. The following goals were discussed with the patient and patient is in agreement with them as to be addressed in the treatment plan. The patient's rehab potential is Good.     Anticipated barriers to occupational therapy: none   Pt has no cultural, educational or language barriers to learning provided.    Profile and History Assessment of Occupational Performance Level of Clinical Decision Making Complexity Score   Occupational Profile:   Elliott Hernandez is a 42 y.o. male who is currently employed Elliott Hernandez has difficulty with  ADLs and IADLs as listed previously, which  affecting his/her daily functional abilities.      Comorbidities:    has a past medical history of Sleep apnea.    Medical and Therapy History Review:   Expanded               Performance Deficits    Physical:  Joint Mobility  Skin Integrity/Scar Formation  Edema    Cognitive:  No Deficits    Psychosocial:    Habits  Routines  Rituals     Clinical Decision Making:  low    Assessment Process:  Problem-Focused Assessments    Modification/Need for Assistance:  Not Necessary    Intervention Selection:  Multiple Treatment Options       low  Based on PMHX, co morbidities , data from assessments and functional level of assistance required with task and clinical presentation directly impacting function.         Goals:    LTG's (12 weeks):  1)   Increase thumb MP flexion by 15-20 degrees   2)   Demonstrate 70-90 psi's of left  strength to grasp heavier household objects   3)   Demonstrate 15-20 psi's of lateral pinch to independently manipulate heavier fine motor tasks   4)   Decrease complaints of pain to  1 out of 10 at worst to increase functional hand use for ADL/work/leisure activities.  5)   Patient to score at  20% or less on FOTO to demonstrate improved perception of functional left upper extremity use.  6)   Pt will return to near to prior level of function for ADLs and household management reporting I or Mod I with ADLs  7) Demonstrate left thumb opposition to DPC    STG's (4 weeks)  1)   Patient to be IND with HEP and modalities for pain/edema managment.  2)   Increase thumb MP flexion by 5-10 degrees   3)   Demonstrate 20-30 psi's of left  strength to improve functional grasp for ADLs/work/leisure activities.   4)   Demonstrate 7-10 psi's of left lateral pinch to increase independence with button and FM Coordination.   5)   Patient to be IND with Orthotic use, wear and care precautions.           Plan     Pt to be treated by Occupational Therapy 2 times per week for 12 weeks during the certification period from 8/8/2023 to 11/3/23 to achieve the established goals.     Treatment to include: Paraffin, Fluidotherapy, Manual therapy/joint mobilizations, Modalities for pain management, US 3 mhz, Therapeutic exercises/activities., Iontophoresis with 2.0 cc Dexamethasone, Strengthening, Orthotic Fabrication/Fit/Training, Edema Control, Scar Management, Wound Care, Electrical Modalities, Joint Protection, and Energy Conservation, as well as any other treatments deemed necessary based on the patient's needs or progress.       Haritha Watson OTR/L

## 2023-08-09 NOTE — PROGRESS NOTES
"  Occupational Therapy Daily Treatment Note     Date: 8/10/2023  Name: Elliott Hernandez  Clinic Number: [id number]    Therapy Diagnosis:   Encounter Diagnoses   Name Primary?    Left hand pain Yes    Left hand weakness      Physician: Maribell Holt PA-C    Physician Orders: Eval and treat - "status post left hand incision and drainage with scar revision by Dr. Whitaker on 7/21/23  2x/wk x 6 wk"  Medical Diagnosis: Post-operative state [Z98.890]  Evaluation Date: 8/8/2023  Plan of Care Certification Date: 11/3/23  Authorization Period: 7/27/23 - 12/31/23  Surgery Date and Procedure:  7/21/23  INCISION AND DRAINAGE, HAND (Left) wound revision scar 5 cm in length, splint application  Date of Return to MD: 9/5/23       Visit #: 1 of 1  Time In: 3:30 pm   Time Out: 4:15 pm      Total Billable Time: 45 mins      Precautions: Standard, bacterial infection (no heat)      Subjective     Pt reports: Pt feels he is getting better  Response to previous treatment:Regla well    Pain: 0/10    Objective     Bucky received the following supervised modalities after being cleared for contraindications for 10 minutes in order to promote increased blood flow and tissue elasticity:   -Fluidotherapy    Bucky received the following manual therapy techniques for 30 minutes in order to maximize tissue function and/or decrease pain:   -Pinky slides 20  -Isospheres 3'  -Octi 3'  -Med pom poms blue CP 3 containers  -Pink putty squeezes 10, 2P/lat pinches 3 logs, thumb ext 10    Suture fragment visible, still beneath the level of the skin.     Home Exercises and Education Provided     Education provided:   - Use of heat  - Progress towards goals     Written Home Exercises Provided: yes.  Exercises were reviewed and Bucky was able to demonstrate them prior to the end of the session.  Bucky demonstrated good  understanding of the HEP provided.   .   See EMR under Patient Instructions for exercises provided 8/10/2023.        Assessment     Pt " would continue to benefit from skilled OT to maximize LUE function.     Bucky is progressing well towards his goals and there are no updates to goals at this time. Pt prognosis is Good.     Pt will continue to benefit from skilled outpatient occupational therapy to address the deficits listed in the problem list on initial evaluation provide pt/family education and to maximize pt's level of independence in the home and community environment.     Anticipated barriers to therapy: None      Pt's spiritual, cultural and educational needs considered and pt agreeable to plan of care and goals.    Goals:   LTG's (12 weeks):  1)   Increase thumb MP flexion by 15-20 degrees Progressing  2)   Demonstrate 70-90 psi's of left  strength to grasp heavier household objects. Progressing   3)   Demonstrate 15-20 psi's of lateral pinch to independently manipulate heavier fine motor tasks. Progressing  4)   Decrease complaints of pain to  1 out of 10 at worst to increase functional hand use for ADL/work/leisure activities. Progressing  5)   Patient to score at 20% or less on FOTO to demonstrate improved perception of functional left upper extremity use. Progressing  6)   Pt will return to near to prior level of function for ADLs and household management reporting I or Mod I with ADLs. Progressing  7) Demonstrate left thumb opposition to DPC. Progressing     STG's (4 weeks)  1)   Patient to be IND with HEP and modalities for pain/edema managment. Progressing  2)   Increase thumb MP flexion by 5-10 degrees. Progressing   3)   Demonstrate 20-30 psi's of left  strength to improve functional grasp for ADLs/work/leisure activities. Progressing  4)   Demonstrate 7-10 psi's of left lateral pinch to increase independence with button and FM Coordination. Progressing  5)   Patient to be IND with Orthotic use, wear and care precautions. Progressing       Plan   Cont OT to address above goals.        CHLOE Ruelas OTR/L, CHT

## 2023-08-09 NOTE — PROGRESS NOTES
I have personally taken the history and examined this patient. I agree with the resident's note as stated above.        42 y.o. yo male with Left hand laceration following a fall, s/p suture repair 7/9/23.     Plan:    OR 7/21/23 for I&D of left hand due to poor wound healing  Risks, benefits, indications, and alternatives discussed. 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

## 2023-08-10 ENCOUNTER — OFFICE VISIT (OUTPATIENT)
Dept: INFECTIOUS DISEASES | Facility: CLINIC | Age: 43
End: 2023-08-10
Payer: COMMERCIAL

## 2023-08-10 ENCOUNTER — CLINICAL SUPPORT (OUTPATIENT)
Dept: REHABILITATION | Facility: HOSPITAL | Age: 43
End: 2023-08-10
Payer: COMMERCIAL

## 2023-08-10 VITALS
HEIGHT: 74 IN | DIASTOLIC BLOOD PRESSURE: 78 MMHG | SYSTOLIC BLOOD PRESSURE: 130 MMHG | TEMPERATURE: 98 F | HEART RATE: 83 BPM | BODY MASS INDEX: 31.83 KG/M2 | WEIGHT: 248 LBS

## 2023-08-10 DIAGNOSIS — S60.512D INFECTED ABRASION OF LEFT HAND, SUBSEQUENT ENCOUNTER: Primary | ICD-10-CM

## 2023-08-10 DIAGNOSIS — M79.642 LEFT HAND PAIN: Primary | ICD-10-CM

## 2023-08-10 DIAGNOSIS — R29.898 LEFT HAND WEAKNESS: ICD-10-CM

## 2023-08-10 DIAGNOSIS — L08.9 INFECTED ABRASION OF LEFT HAND, SUBSEQUENT ENCOUNTER: Primary | ICD-10-CM

## 2023-08-10 PROCEDURE — 3075F PR MOST RECENT SYSTOLIC BLOOD PRESS GE 130-139MM HG: ICD-10-PCS | Mod: CPTII,S$GLB,, | Performed by: REGISTERED NURSE

## 2023-08-10 PROCEDURE — 99999 PR PBB SHADOW E&M-EST. PATIENT-LVL IV: CPT | Mod: PBBFAC,,, | Performed by: REGISTERED NURSE

## 2023-08-10 PROCEDURE — 3044F PR MOST RECENT HEMOGLOBIN A1C LEVEL <7.0%: ICD-10-PCS | Mod: CPTII,S$GLB,, | Performed by: REGISTERED NURSE

## 2023-08-10 PROCEDURE — 3078F PR MOST RECENT DIASTOLIC BLOOD PRESSURE < 80 MM HG: ICD-10-PCS | Mod: CPTII,S$GLB,, | Performed by: REGISTERED NURSE

## 2023-08-10 PROCEDURE — 1159F MED LIST DOCD IN RCRD: CPT | Mod: CPTII,S$GLB,, | Performed by: REGISTERED NURSE

## 2023-08-10 PROCEDURE — 3008F BODY MASS INDEX DOCD: CPT | Mod: CPTII,S$GLB,, | Performed by: REGISTERED NURSE

## 2023-08-10 PROCEDURE — 99999 PR PBB SHADOW E&M-EST. PATIENT-LVL IV: ICD-10-PCS | Mod: PBBFAC,,, | Performed by: REGISTERED NURSE

## 2023-08-10 PROCEDURE — 1159F PR MEDICATION LIST DOCUMENTED IN MEDICAL RECORD: ICD-10-PCS | Mod: CPTII,S$GLB,, | Performed by: REGISTERED NURSE

## 2023-08-10 PROCEDURE — 97530 THERAPEUTIC ACTIVITIES: CPT | Mod: PO

## 2023-08-10 PROCEDURE — 99212 PR OFFICE/OUTPT VISIT, EST, LEVL II, 10-19 MIN: ICD-10-PCS | Mod: S$GLB,,, | Performed by: REGISTERED NURSE

## 2023-08-10 PROCEDURE — 97022 WHIRLPOOL THERAPY: CPT | Mod: PO

## 2023-08-10 PROCEDURE — 3044F HG A1C LEVEL LT 7.0%: CPT | Mod: CPTII,S$GLB,, | Performed by: REGISTERED NURSE

## 2023-08-10 PROCEDURE — 3008F PR BODY MASS INDEX (BMI) DOCUMENTED: ICD-10-PCS | Mod: CPTII,S$GLB,, | Performed by: REGISTERED NURSE

## 2023-08-10 PROCEDURE — 3075F SYST BP GE 130 - 139MM HG: CPT | Mod: CPTII,S$GLB,, | Performed by: REGISTERED NURSE

## 2023-08-10 PROCEDURE — 3078F DIAST BP <80 MM HG: CPT | Mod: CPTII,S$GLB,, | Performed by: REGISTERED NURSE

## 2023-08-10 PROCEDURE — 99212 OFFICE O/P EST SF 10 MIN: CPT | Mod: S$GLB,,, | Performed by: REGISTERED NURSE

## 2023-08-10 NOTE — PATIENT INSTRUCTIONS
Strengthening (Resistive Putty)        Squeeze putty using thumb and all fingers.  Repeat 10-20 times. Do 1-2 sessions per day.      Palmar Pinch Strengthening (Resistive Putty)        Pinch putty between thumb and index fingertip.  Repeat 3 logs. Do 1-2 sessions per day.       Lateral Pinch Strengthening (Resistive Putty)        Squeeze between thumb and side of index finger.  Repeat 3 logs. Do 1-2 sessions per day.      Extension (Resistive Putty)        Straighten thumb inside putty loop anchored by fingers.  Repeat 10-20 times. Do 1-2 sessions per day.

## 2023-08-10 NOTE — PROGRESS NOTES
Subjective     Patient ID: Elliott Hernandez is a 42 y.o. male.    Chief Complaint: Follow-up    HPI    Mr. Hernandez is a 42 year old male with a PMH of left ear hearing loss, sleep apnea, who presents following left hand laceration. [place] and [place], he reached to a metal fence to brace his fall. Following injury, he presented to [facility] in which he was sutured, and was given a tetanus booster as well as keflex abx rx. Pt then presented to Dr. Josue villalobos with ortho hand surgery for follow up. Pt is s/p I&D with Josue villalobos on 7/21/23 for poor wound healing. Intra op cultures + leclercia adecarboxylata, tetracycline S.     Pt is present today for follow up. Has completed x14d of doxy/Levaquin without issue. Denies fever, body aches, chills. Denies drainage from incision. Denies redness, swelling. States his hand is healing nicely. Remains with a stitch within the incision, ortho hand aware.         Review of Systems   Constitutional:  Negative for chills, diaphoresis, fatigue and fever.   HENT: Negative.     Respiratory:  Negative for cough and shortness of breath.    Cardiovascular:  Negative for chest pain, palpitations and leg swelling.   Gastrointestinal:  Negative for constipation, diarrhea and nausea.   Genitourinary:  Negative for dysuria.   Musculoskeletal:  Negative for arthralgias and leg pain.   Neurological:  Negative for dizziness.   Psychiatric/Behavioral:  Negative for agitation and confusion.           Objective     Physical Exam  Vitals reviewed.   Constitutional:       General: He is not in acute distress.     Appearance: Normal appearance. He is not ill-appearing.   HENT:      Head: Normocephalic.      Nose: Nose normal.      Mouth/Throat:      Mouth: Mucous membranes are moist.      Pharynx: Oropharynx is clear.   Eyes:      General:         Right eye: No discharge.         Left eye: No discharge.      Conjunctiva/sclera: Conjunctivae  normal.   Pulmonary:      Effort: Pulmonary effort is normal. No respiratory distress.      Breath sounds: No stridor.   Abdominal:      General: Abdomen is flat. There is no distension.      Palpations: Abdomen is soft.   Musculoskeletal:         General: Normal range of motion.      Cervical back: Normal range of motion.      Right lower leg: No edema.      Left lower leg: No edema.   Skin:     Findings: No erythema or rash.      Comments: Healing incision, see media   Neurological:      General: No focal deficit present.      Mental Status: He is alert and oriented to person, place, and time.      Motor: No weakness.      Gait: Gait normal.   Psychiatric:         Mood and Affect: Mood normal.         Behavior: Behavior normal.         Thought Content: Thought content normal.         Judgment: Judgment normal.              Assessment and Plan     1. Infected abrasion of left hand, subsequent encounter        Okay to monitor off abx  Return to clinic if fevers, chills, sweats, diarrhea, n/v or increase in pain, swelling, drainage from wound.   Follow up PRN

## 2023-08-15 ENCOUNTER — HOSPITAL ENCOUNTER (OUTPATIENT)
Dept: RADIOLOGY | Facility: OTHER | Age: 43
Discharge: HOME | End: 2023-08-15
Attending: INTERNAL MEDICINE
Payer: COMMERCIAL

## 2023-08-15 ENCOUNTER — OFFICE VISIT (OUTPATIENT)
Dept: INTERNAL MEDICINE | Facility: CLINIC | Age: 43
End: 2023-08-15
Payer: COMMERCIAL

## 2023-08-15 VITALS
OXYGEN SATURATION: 98 % | HEIGHT: 74 IN | BODY MASS INDEX: 31.94 KG/M2 | SYSTOLIC BLOOD PRESSURE: 125 MMHG | HEART RATE: 88 BPM | DIASTOLIC BLOOD PRESSURE: 82 MMHG | WEIGHT: 248.88 LBS

## 2023-08-15 DIAGNOSIS — R19.03 ABDOMINAL RIGHT LOWER QUADRANT SWELLING: Primary | ICD-10-CM

## 2023-08-15 DIAGNOSIS — R19.03 ABDOMINAL RIGHT LOWER QUADRANT SWELLING: ICD-10-CM

## 2023-08-15 DIAGNOSIS — R10.31 RIGHT LOWER QUADRANT PAIN: ICD-10-CM

## 2023-08-15 PROCEDURE — 99999 PR PBB SHADOW E&M-EST. PATIENT-LVL IV: ICD-10-PCS | Mod: PBBFAC,,, | Performed by: INTERNAL MEDICINE

## 2023-08-15 PROCEDURE — 99999 PR PBB SHADOW E&M-EST. PATIENT-LVL IV: CPT | Mod: PBBFAC,,, | Performed by: INTERNAL MEDICINE

## 2023-08-15 PROCEDURE — 74177 CT ABDOMEN PELVIS WITH CONTRAST: ICD-10-PCS | Mod: 26,,, | Performed by: STUDENT IN AN ORGANIZED HEALTH CARE EDUCATION/TRAINING PROGRAM

## 2023-08-15 PROCEDURE — 3008F BODY MASS INDEX DOCD: CPT | Mod: CPTII,S$GLB,, | Performed by: INTERNAL MEDICINE

## 2023-08-15 PROCEDURE — 1159F MED LIST DOCD IN RCRD: CPT | Mod: CPTII,S$GLB,, | Performed by: INTERNAL MEDICINE

## 2023-08-15 PROCEDURE — 1160F PR REVIEW ALL MEDS BY PRESCRIBER/CLIN PHARMACIST DOCUMENTED: ICD-10-PCS | Mod: CPTII,S$GLB,, | Performed by: INTERNAL MEDICINE

## 2023-08-15 PROCEDURE — 1159F PR MEDICATION LIST DOCUMENTED IN MEDICAL RECORD: ICD-10-PCS | Mod: CPTII,S$GLB,, | Performed by: INTERNAL MEDICINE

## 2023-08-15 PROCEDURE — 3074F SYST BP LT 130 MM HG: CPT | Mod: CPTII,S$GLB,, | Performed by: INTERNAL MEDICINE

## 2023-08-15 PROCEDURE — 74177 CT ABD & PELVIS W/CONTRAST: CPT | Mod: TC

## 2023-08-15 PROCEDURE — 3079F PR MOST RECENT DIASTOLIC BLOOD PRESSURE 80-89 MM HG: ICD-10-PCS | Mod: CPTII,S$GLB,, | Performed by: INTERNAL MEDICINE

## 2023-08-15 PROCEDURE — 3008F PR BODY MASS INDEX (BMI) DOCUMENTED: ICD-10-PCS | Mod: CPTII,S$GLB,, | Performed by: INTERNAL MEDICINE

## 2023-08-15 PROCEDURE — 3044F PR MOST RECENT HEMOGLOBIN A1C LEVEL <7.0%: ICD-10-PCS | Mod: CPTII,S$GLB,, | Performed by: INTERNAL MEDICINE

## 2023-08-15 PROCEDURE — 3079F DIAST BP 80-89 MM HG: CPT | Mod: CPTII,S$GLB,, | Performed by: INTERNAL MEDICINE

## 2023-08-15 PROCEDURE — 74177 CT ABD & PELVIS W/CONTRAST: CPT | Mod: 26,,, | Performed by: STUDENT IN AN ORGANIZED HEALTH CARE EDUCATION/TRAINING PROGRAM

## 2023-08-15 PROCEDURE — 1160F RVW MEDS BY RX/DR IN RCRD: CPT | Mod: CPTII,S$GLB,, | Performed by: INTERNAL MEDICINE

## 2023-08-15 PROCEDURE — 3074F PR MOST RECENT SYSTOLIC BLOOD PRESSURE < 130 MM HG: ICD-10-PCS | Mod: CPTII,S$GLB,, | Performed by: INTERNAL MEDICINE

## 2023-08-15 PROCEDURE — 99214 OFFICE O/P EST MOD 30 MIN: CPT | Mod: S$GLB,,, | Performed by: INTERNAL MEDICINE

## 2023-08-15 PROCEDURE — 99214 PR OFFICE/OUTPT VISIT, EST, LEVL IV, 30-39 MIN: ICD-10-PCS | Mod: S$GLB,,, | Performed by: INTERNAL MEDICINE

## 2023-08-15 PROCEDURE — 25500020 PHARM REV CODE 255: Performed by: INTERNAL MEDICINE

## 2023-08-15 PROCEDURE — 3044F HG A1C LEVEL LT 7.0%: CPT | Mod: CPTII,S$GLB,, | Performed by: INTERNAL MEDICINE

## 2023-08-15 RX ADMIN — IOHEXOL 100 ML: 350 INJECTION, SOLUTION INTRAVENOUS at 06:08

## 2023-08-15 NOTE — PROGRESS NOTES
"Subjective:       Patient ID: Elliott Hernandez is a 42 y.o. male.    Chief Complaint: abdominal swelling    HPI  42 y.o. male here for evaluation of    Started last week with back pain. Felt different than his usual msk back pain. Only standing was comfortable. Very uncomfortable sitting on plane.   This am back felt better but right low abd swelling started which is new.     Allopurinol for gout but not recently.  No fevers, slight constipation. No groin or scrotal swelling.  Review of Systems   Constitutional:  Negative for fever.   Respiratory:  Negative for shortness of breath.    Cardiovascular:  Negative for chest pain.   Gastrointestinal:         Swelling over right lower quadrant   Musculoskeletal: Negative.    Skin: Negative.        Objective:   /82 (BP Location: Right arm, Patient Position: Standing, BP Method: Medium (Manual))   Pulse 88   Ht 6' 2" (1.88 m)   Wt 112.9 kg (248 lb 14.4 oz)   SpO2 98%   BMI 31.96 kg/m²      Physical Exam  Constitutional:       General: He is not in acute distress.     Appearance: He is well-developed. He is not diaphoretic.   HENT:      Head: Normocephalic and atraumatic.   Cardiovascular:      Rate and Rhythm: Normal rate and regular rhythm.      Heart sounds: No murmur heard.  Pulmonary:      Effort: Pulmonary effort is normal. No respiratory distress.      Breath sounds: No wheezing or rales.   Abdominal:      General: There is distension.      Palpations: Abdomen is soft. There is no mass.      Tenderness: There is no abdominal tenderness. There is no right CVA tenderness, left CVA tenderness or rebound.      Comments: Right lower quadrant protrudes compared to left without discrete mass nor hernia appreciated and no focal tenderness   Skin:     General: Skin is warm and dry.   Neurological:      Mental Status: He is alert and oriented to person, place, and time.   Psychiatric:         Behavior: Behavior normal.         Assessment:       1. Abdominal " right lower quadrant swelling    2. Right lower quadrant pain        Plan:       1. Abdominal right lower quadrant swelling  -     CT Abdomen Pelvis With Contrast; Future; Expected date: 08/15/2023  -     CBC Auto Differential; Future; Expected date: 08/15/2023  -     COMPREHENSIVE METABOLIC PANEL; Future; Expected date: 08/15/2023    2. Right lower quadrant pain  -     CT Abdomen Pelvis With Contrast; Future; Expected date: 08/15/2023  -     CBC Auto Differential; Future; Expected date: 08/15/2023  -     COMPREHENSIVE METABOLIC PANEL; Future; Expected date: 08/15/2023           Health Maintenance Due   Topic    Lipid Panel

## 2023-08-16 ENCOUNTER — PATIENT MESSAGE (OUTPATIENT)
Dept: INTERNAL MEDICINE | Facility: CLINIC | Age: 43
End: 2023-08-16
Payer: COMMERCIAL

## 2023-08-16 ENCOUNTER — CLINICAL SUPPORT (OUTPATIENT)
Dept: REHABILITATION | Facility: HOSPITAL | Age: 43
End: 2023-08-16
Payer: COMMERCIAL

## 2023-08-16 ENCOUNTER — TELEPHONE (OUTPATIENT)
Dept: ENDOSCOPY | Facility: HOSPITAL | Age: 43
End: 2023-08-16
Payer: COMMERCIAL

## 2023-08-16 DIAGNOSIS — M79.642 LEFT HAND PAIN: Primary | ICD-10-CM

## 2023-08-16 DIAGNOSIS — R10.31 RIGHT LOWER QUADRANT ABDOMINAL PAIN: Primary | ICD-10-CM

## 2023-08-16 DIAGNOSIS — N40.0 ENLARGED PROSTATE: ICD-10-CM

## 2023-08-16 DIAGNOSIS — R29.898 LEFT HAND WEAKNESS: ICD-10-CM

## 2023-08-16 PROCEDURE — 97140 MANUAL THERAPY 1/> REGIONS: CPT | Mod: PO

## 2023-08-16 PROCEDURE — 97530 THERAPEUTIC ACTIVITIES: CPT | Mod: PO

## 2023-08-17 ENCOUNTER — LAB VISIT (OUTPATIENT)
Dept: LAB | Facility: HOSPITAL | Age: 43
End: 2023-08-17
Attending: INTERNAL MEDICINE
Payer: COMMERCIAL

## 2023-08-17 DIAGNOSIS — N40.0 ENLARGED PROSTATE: ICD-10-CM

## 2023-08-17 PROCEDURE — 36415 COLL VENOUS BLD VENIPUNCTURE: CPT | Mod: PO | Performed by: INTERNAL MEDICINE

## 2023-08-17 PROCEDURE — 84153 ASSAY OF PSA TOTAL: CPT | Performed by: INTERNAL MEDICINE

## 2023-08-17 NOTE — TELEPHONE ENCOUNTER
----- Message from Amber Carreno sent at 8/16/2023  3:34 PM CDT -----  Regarding: Appt Access  Contact: pt [phone]  Pt calling to schedule appt Right lower quadrant abdominal pain. Referral in [facility], pls call

## 2023-08-17 NOTE — PROGRESS NOTES
"  Occupational Therapy Daily Treatment Note     Date: 8/16/2023  Name: Elliott Hernandez  Clinic Number: [id number]    Therapy Diagnosis:   Encounter Diagnoses   Name Primary?    Left hand pain Yes    Left hand weakness      Physician: Maribell Holt PA-C    Physician Orders: Eval and treat - "status post left hand incision and drainage with scar revision by Dr. Whitaker on 7/21/23  2x/wk x 6 wk"  Medical Diagnosis: Post-operative state [Z98.890]  Evaluation Date: 8/8/2023  Plan of Care Certification Date: 11/3/23  Authorization Period: 7/27/23 - 12/31/23  Surgery Date and Procedure:  7/21/23  INCISION AND DRAINAGE, HAND (Left) wound revision scar 5 cm in length, splint application  Date of Return to MD: 9/5/23      Visit #: 2 / 8   Time In: 3:45 pm   Time Out: 4:30 pm      Total Billable Time: 45 mins      Precautions: Standard, bacterial infection (no heat)      Subjective     Pt reports: He points out are where the suture fragment remains. He reports this is painful to massage/uncomfortable to bear weight through   Response to previous treatment:Regla well    Pain: 0/10    Objective   Bucky received the following therapeutic activity for 35 minutes in order to maximize tissue function and/or decrease pain:   - Suture fragment removal   - Education on tissue involvement/scar healing  - Education of holding off on putty exercises until small scab heals (from suture fragment removal)    Bucky received the following manual therapy techniques for 10 minutes in order to maximize tissue function and/or decrease pain:   - Scar massage with and without tool, avoiding area where suture fragment was removed             Home Exercises and Education Provided     Education provided:   - Use of heat  - Progress towards goals     Written Home Exercises Provided: Patient instructed to cont prior HEP.  Exercises were reviewed and Bucky was able to demonstrate them prior to the end of the session.  Bucyk demonstrated good  " understanding of the HEP provided.   .   See EMR under Patient Instructions for exercises provided 8/10/2023.        Assessment     Bucky tolerated OT session well. Upon arrival, suture fragment embedded along scar with skin growth on top. He reports this has been making scar massage and weight bearing through the scar uncomfortable. Removed suture fragment without difficulty and discussed light ax with the hand/no putty until one small area heals. He verbalized understanding.     Bucky is progressing well towards his goals and there are no updates to goals at this time. Pt prognosis is Good.     Pt will continue to benefit from skilled outpatient occupational therapy to address the deficits listed in the problem list on initial evaluation provide pt/family education and to maximize pt's level of independence in the home and community environment.     Anticipated barriers to therapy: None      Pt's spiritual, cultural and educational needs considered and pt agreeable to plan of care and goals.    Goals:   LTG's (12 weeks):  1)   Increase thumb MP flexion by 15-20 degrees Progressing  2)   Demonstrate 70-90 psi's of left  strength to grasp heavier household objects. Progressing   3)   Demonstrate 15-20 psi's of lateral pinch to independently manipulate heavier fine motor tasks. Progressing  4)   Decrease complaints of pain to  1 out of 10 at worst to increase functional hand use for ADL/work/leisure activities. Progressing  5)   Patient to score at 20% or less on FOTO to demonstrate improved perception of functional left upper extremity use. Progressing  6)   Pt will return to near to prior level of function for ADLs and household management reporting I or Mod I with ADLs. Progressing  7) Demonstrate left thumb opposition to DPC. Progressing     STG's (4 weeks)  1)   Patient to be IND with HEP and modalities for pain/edema managment. Progressing  2)   Increase thumb MP flexion by 5-10 degrees. Progressing   3)    Demonstrate 20-30 psi's of left  strength to improve functional grasp for ADLs/work/leisure activities. Progressing  4)   Demonstrate 7-10 psi's of left lateral pinch to increase independence with button and FM Coordination. Progressing  5)   Patient to be IND with Orthotic use, wear and care precautions. Progressing       Plan   Cont OT to address above goals.        Haritha Watson, OTR/L

## 2023-08-18 LAB — COMPLEXED PSA SERPL-MCNC: 0.87 NG/ML (ref 0–4)

## 2023-08-21 ENCOUNTER — OFFICE VISIT (OUTPATIENT)
Dept: GASTROENTEROLOGY | Facility: CLINIC | Age: 43
End: 2023-08-21
Payer: COMMERCIAL

## 2023-08-21 ENCOUNTER — HOSPITAL ENCOUNTER (OUTPATIENT)
Dept: RADIOLOGY | Facility: HOSPITAL | Age: 43
Discharge: HOME | End: 2023-08-21
Attending: STUDENT IN AN ORGANIZED HEALTH CARE EDUCATION/TRAINING PROGRAM
Payer: COMMERCIAL

## 2023-08-21 VITALS
HEIGHT: 75 IN | BODY MASS INDEX: 30.04 KG/M2 | SYSTOLIC BLOOD PRESSURE: 125 MMHG | HEART RATE: 86 BPM | WEIGHT: 241.63 LBS | DIASTOLIC BLOOD PRESSURE: 84 MMHG

## 2023-08-21 DIAGNOSIS — K59.00 CONSTIPATION, UNSPECIFIED CONSTIPATION TYPE: Primary | ICD-10-CM

## 2023-08-21 DIAGNOSIS — K59.00 CONSTIPATION, UNSPECIFIED CONSTIPATION TYPE: ICD-10-CM

## 2023-08-21 PROCEDURE — 1160F PR REVIEW ALL MEDS BY PRESCRIBER/CLIN PHARMACIST DOCUMENTED: ICD-10-PCS | Mod: CPTII,S$GLB,, | Performed by: STUDENT IN AN ORGANIZED HEALTH CARE EDUCATION/TRAINING PROGRAM

## 2023-08-21 PROCEDURE — 3079F DIAST BP 80-89 MM HG: CPT | Mod: CPTII,S$GLB,, | Performed by: STUDENT IN AN ORGANIZED HEALTH CARE EDUCATION/TRAINING PROGRAM

## 2023-08-21 PROCEDURE — 74018 RADEX ABDOMEN 1 VIEW: CPT | Mod: 26,,, | Performed by: RADIOLOGY

## 2023-08-21 PROCEDURE — 3008F PR BODY MASS INDEX (BMI) DOCUMENTED: ICD-10-PCS | Mod: CPTII,S$GLB,, | Performed by: STUDENT IN AN ORGANIZED HEALTH CARE EDUCATION/TRAINING PROGRAM

## 2023-08-21 PROCEDURE — 3074F SYST BP LT 130 MM HG: CPT | Mod: CPTII,S$GLB,, | Performed by: STUDENT IN AN ORGANIZED HEALTH CARE EDUCATION/TRAINING PROGRAM

## 2023-08-21 PROCEDURE — 3044F HG A1C LEVEL LT 7.0%: CPT | Mod: CPTII,S$GLB,, | Performed by: STUDENT IN AN ORGANIZED HEALTH CARE EDUCATION/TRAINING PROGRAM

## 2023-08-21 PROCEDURE — 3044F PR MOST RECENT HEMOGLOBIN A1C LEVEL <7.0%: ICD-10-PCS | Mod: CPTII,S$GLB,, | Performed by: STUDENT IN AN ORGANIZED HEALTH CARE EDUCATION/TRAINING PROGRAM

## 2023-08-21 PROCEDURE — 99999 PR PBB SHADOW E&M-EST. PATIENT-LVL IV: CPT | Mod: PBBFAC,,, | Performed by: STUDENT IN AN ORGANIZED HEALTH CARE EDUCATION/TRAINING PROGRAM

## 2023-08-21 PROCEDURE — 1159F PR MEDICATION LIST DOCUMENTED IN MEDICAL RECORD: ICD-10-PCS | Mod: CPTII,S$GLB,, | Performed by: STUDENT IN AN ORGANIZED HEALTH CARE EDUCATION/TRAINING PROGRAM

## 2023-08-21 PROCEDURE — 99204 OFFICE O/P NEW MOD 45 MIN: CPT | Mod: S$GLB,,, | Performed by: STUDENT IN AN ORGANIZED HEALTH CARE EDUCATION/TRAINING PROGRAM

## 2023-08-21 PROCEDURE — 99999 PR PBB SHADOW E&M-EST. PATIENT-LVL IV: ICD-10-PCS | Mod: PBBFAC,,, | Performed by: STUDENT IN AN ORGANIZED HEALTH CARE EDUCATION/TRAINING PROGRAM

## 2023-08-21 PROCEDURE — 3079F PR MOST RECENT DIASTOLIC BLOOD PRESSURE 80-89 MM HG: ICD-10-PCS | Mod: CPTII,S$GLB,, | Performed by: STUDENT IN AN ORGANIZED HEALTH CARE EDUCATION/TRAINING PROGRAM

## 2023-08-21 PROCEDURE — 1159F MED LIST DOCD IN RCRD: CPT | Mod: CPTII,S$GLB,, | Performed by: STUDENT IN AN ORGANIZED HEALTH CARE EDUCATION/TRAINING PROGRAM

## 2023-08-21 PROCEDURE — 99204 PR OFFICE/OUTPT VISIT, NEW, LEVL IV, 45-59 MIN: ICD-10-PCS | Mod: S$GLB,,, | Performed by: STUDENT IN AN ORGANIZED HEALTH CARE EDUCATION/TRAINING PROGRAM

## 2023-08-21 PROCEDURE — 3074F PR MOST RECENT SYSTOLIC BLOOD PRESSURE < 130 MM HG: ICD-10-PCS | Mod: CPTII,S$GLB,, | Performed by: STUDENT IN AN ORGANIZED HEALTH CARE EDUCATION/TRAINING PROGRAM

## 2023-08-21 PROCEDURE — 74018 RADEX ABDOMEN 1 VIEW: CPT | Mod: TC

## 2023-08-21 PROCEDURE — 74018 XR KUB: ICD-10-PCS | Mod: 26,,, | Performed by: RADIOLOGY

## 2023-08-21 PROCEDURE — 3008F BODY MASS INDEX DOCD: CPT | Mod: CPTII,S$GLB,, | Performed by: STUDENT IN AN ORGANIZED HEALTH CARE EDUCATION/TRAINING PROGRAM

## 2023-08-21 PROCEDURE — 1160F RVW MEDS BY RX/DR IN RCRD: CPT | Mod: CPTII,S$GLB,, | Performed by: STUDENT IN AN ORGANIZED HEALTH CARE EDUCATION/TRAINING PROGRAM

## 2023-08-21 NOTE — PROGRESS NOTES
"GENERAL GI PATIENT INTAKE:    COVID symptoms in the last 7 days (runny nose, sore throat, congestion, cough, fever): No  PCP: Dr.Jennifer Padilla  If not PCP-  number given to establish [phone]: N/A    ALLERGIES REVIEWED:  Yes    CHIEF COMPLAINT:    Chief Complaint   Patient presents with    GI Problem     Abdominal swelling on the right side       VITAL SIGNS:  /84   Pulse 86   Ht 6' 3" (1.905 m)   Wt 109.6 kg (241 lb 10 oz)   BMI 30.20 kg/m²      Change in medical, surgical, family or social history: No      REVIEWED MEDICATION LIST RECONCILED INCLUDING ABOVE MEDS:  Yes,    "

## 2023-08-21 NOTE — PROGRESS NOTES
I have reviewed the notes, assessments, and/or procedures performed this visit, and I concur with the documentation.    As noted, otherwise young healthy individual   One week of pain that starts in the back, with possibly some component in the right groin, but then with distention and discomfort in the right lower quadrant.  He was started on MiraLax in his bowel movements are improved but the pain has persisted.  The pain does not get worse with any activity in fact it is worse when he lays down at night.  I would like to check inflammatory markers now because of concern for possible atypical appendicitis, also wonder if it could be some type of atypical hernia.

## 2023-08-21 NOTE — PROGRESS NOTES
[facility]    Reason for visit: There were no encounter diagnoses.  Referring Provider/PCP: No, Primary Doctor    History of Present Illness:  Mr Hernandez is a 41yo PMHx gout presents to [facility] as new patient for abdominal discomfort, bloating.    Reports one week of RLQ swelling and abdominal discomfort. No clear trigger. Saw Dr Daysi MATOS at [facility] last week who recommended initiating miralax and fiber. Reports he was constipated prior to this and miralax and fiber have been helpful but not with swelling or abdominal discomfort. Reports associated back pain.     No fevers, diarrhea, melena, hematochezia.     No prior EGD/ colonoscopy. Recent use of antibiotics for hand infection.    CTAP w/ IV 08/15 contrast notable for hepatomegaly, GB normal, pancreas normal. Appendix is not well visualized.      Physical Exam:  Constitutional:  not in acute distress and well developed  HENT: Head: Normal, normocephalic, atraumatic.  Eyes: conjunctiva clear and sclera nonicteric  GI: soft and distended in RLQ, mild tenderness RLQ pain, ileopsoas sign negative  Skin: normal color  Neurological: alert    Assessment:  41yo PMHx gout presents to [facility] as new patient for abdominal discomfort, bloating.    Differential is constipation vs atypical appendicitis with retrocecal appendicitis    Problems:  Abdominal discomfort, RLQ    Plan:  CBC, CRP, ESR  KUB  Continue Miralax and fiber  Will consider colonoscopy if above negative    Todd Mack MD  Gastroenterology Fellow    Orders Placed This Encounter   Procedures    X-Ray KUB    C-REACTIVE PROTEIN    CBC W/ AUTO DIFFERENTIAL    Sedimentation rate

## 2023-08-22 ENCOUNTER — PATIENT MESSAGE (OUTPATIENT)
Dept: GASTROENTEROLOGY | Facility: CLINIC | Age: 43
End: 2023-08-22
Payer: COMMERCIAL

## 2023-08-22 ENCOUNTER — PATIENT MESSAGE (OUTPATIENT)
Dept: INFECTIOUS DISEASES | Facility: CLINIC | Age: 43
End: 2023-08-22
Payer: COMMERCIAL

## 2023-08-22 DIAGNOSIS — R14.0 ABDOMINAL DISTENTION: Primary | ICD-10-CM

## 2023-08-23 LAB
FUNGUS SPEC CULT: NORMAL
FUNGUS SPEC CULT: NORMAL

## 2023-08-24 ENCOUNTER — PATIENT MESSAGE (OUTPATIENT)
Dept: INTERNAL MEDICINE | Facility: CLINIC | Age: 43
End: 2023-08-24
Payer: COMMERCIAL

## 2023-08-24 ENCOUNTER — HOSPITAL ENCOUNTER (OUTPATIENT)
Dept: RADIOLOGY | Facility: HOSPITAL | Age: 43
Discharge: HOME | End: 2023-08-24
Attending: STUDENT IN AN ORGANIZED HEALTH CARE EDUCATION/TRAINING PROGRAM
Payer: COMMERCIAL

## 2023-08-24 DIAGNOSIS — F40.240 CLAUSTROPHOBIA: Primary | ICD-10-CM

## 2023-08-24 DIAGNOSIS — R14.0 ABDOMINAL DISTENTION: ICD-10-CM

## 2023-08-24 PROCEDURE — 74183 MRI ABDOMEN W WO CONTRAST: ICD-10-PCS | Mod: 26,,, | Performed by: RADIOLOGY

## 2023-08-24 PROCEDURE — A9585 GADOBUTROL INJECTION: HCPCS | Performed by: STUDENT IN AN ORGANIZED HEALTH CARE EDUCATION/TRAINING PROGRAM

## 2023-08-24 PROCEDURE — 25500020 PHARM REV CODE 255: Performed by: STUDENT IN AN ORGANIZED HEALTH CARE EDUCATION/TRAINING PROGRAM

## 2023-08-24 PROCEDURE — 74183 MRI ABD W/O CNTR FLWD CNTR: CPT | Mod: 26,,, | Performed by: RADIOLOGY

## 2023-08-24 PROCEDURE — 74183 MRI ABD W/O CNTR FLWD CNTR: CPT | Mod: TC

## 2023-08-24 RX ORDER — ALPRAZOLAM 0.5 MG/1
0.5 TABLET ORAL DAILY PRN
Qty: 2 TABLET | Refills: 0 | Status: SHIPPED | OUTPATIENT
Start: 2023-08-24 | End: 2023-10-04

## 2023-08-24 RX ORDER — GADOBUTROL 604.72 MG/ML
10 INJECTION INTRAVENOUS
Status: COMPLETED | OUTPATIENT
Start: 2023-08-24 | End: 2023-08-24

## 2023-08-24 RX ADMIN — GADOBUTROL 10 ML: 604.72 INJECTION INTRAVENOUS at 06:08

## 2023-08-24 NOTE — TELEPHONE ENCOUNTER
Pt has MRI later this afternoon and is asking what he can take before doing the test. Pt stated that he gets very claustrophobic.

## 2023-08-28 ENCOUNTER — CLINICAL SUPPORT (OUTPATIENT)
Dept: REHABILITATION | Facility: HOSPITAL | Age: 43
End: 2023-08-28
Payer: COMMERCIAL

## 2023-08-28 DIAGNOSIS — M79.642 LEFT HAND PAIN: Primary | ICD-10-CM

## 2023-08-28 DIAGNOSIS — R29.898 LEFT HAND WEAKNESS: ICD-10-CM

## 2023-08-28 PROCEDURE — 97110 THERAPEUTIC EXERCISES: CPT

## 2023-08-28 PROCEDURE — 97140 MANUAL THERAPY 1/> REGIONS: CPT

## 2023-08-28 PROCEDURE — 97035 APP MDLTY 1+ULTRASOUND EA 15: CPT

## 2023-08-28 PROCEDURE — 97530 THERAPEUTIC ACTIVITIES: CPT

## 2023-08-29 ENCOUNTER — OFFICE VISIT (OUTPATIENT)
Dept: UROLOGY | Facility: CLINIC | Age: 43
End: 2023-08-29
Payer: COMMERCIAL

## 2023-08-29 ENCOUNTER — TELEPHONE (OUTPATIENT)
Dept: GASTROENTEROLOGY | Facility: CLINIC | Age: 43
End: 2023-08-29
Payer: COMMERCIAL

## 2023-08-29 ENCOUNTER — TELEPHONE (OUTPATIENT)
Dept: ENDOSCOPY | Facility: HOSPITAL | Age: 43
End: 2023-08-29
Payer: COMMERCIAL

## 2023-08-29 VITALS
BODY MASS INDEX: 29.59 KG/M2 | SYSTOLIC BLOOD PRESSURE: 115 MMHG | DIASTOLIC BLOOD PRESSURE: 79 MMHG | HEART RATE: 81 BPM | HEIGHT: 75 IN | WEIGHT: 238 LBS

## 2023-08-29 DIAGNOSIS — Z12.11 SPECIAL SCREENING FOR MALIGNANT NEOPLASMS, COLON: Primary | ICD-10-CM

## 2023-08-29 DIAGNOSIS — N52.9 ERECTILE DYSFUNCTION, UNSPECIFIED ERECTILE DYSFUNCTION TYPE: Primary | ICD-10-CM

## 2023-08-29 DIAGNOSIS — R10.9 ABDOMINAL PAIN, UNSPECIFIED ABDOMINAL LOCATION: ICD-10-CM

## 2023-08-29 DIAGNOSIS — N40.0 ENLARGED PROSTATE: ICD-10-CM

## 2023-08-29 DIAGNOSIS — R16.0 HEPATOMEGALY: Primary | ICD-10-CM

## 2023-08-29 PROCEDURE — 3044F PR MOST RECENT HEMOGLOBIN A1C LEVEL <7.0%: ICD-10-PCS | Mod: CPTII,S$GLB,, | Performed by: UROLOGY

## 2023-08-29 PROCEDURE — 3074F SYST BP LT 130 MM HG: CPT | Mod: CPTII,S$GLB,, | Performed by: UROLOGY

## 2023-08-29 PROCEDURE — 3008F BODY MASS INDEX DOCD: CPT | Mod: CPTII,S$GLB,, | Performed by: UROLOGY

## 2023-08-29 PROCEDURE — 1159F PR MEDICATION LIST DOCUMENTED IN MEDICAL RECORD: ICD-10-PCS | Mod: CPTII,S$GLB,, | Performed by: UROLOGY

## 2023-08-29 PROCEDURE — 3078F DIAST BP <80 MM HG: CPT | Mod: CPTII,S$GLB,, | Performed by: UROLOGY

## 2023-08-29 PROCEDURE — 3044F HG A1C LEVEL LT 7.0%: CPT | Mod: CPTII,S$GLB,, | Performed by: UROLOGY

## 2023-08-29 PROCEDURE — 99999 PR PBB SHADOW E&M-EST. PATIENT-LVL IV: CPT | Mod: PBBFAC,,, | Performed by: UROLOGY

## 2023-08-29 PROCEDURE — 3078F PR MOST RECENT DIASTOLIC BLOOD PRESSURE < 80 MM HG: ICD-10-PCS | Mod: CPTII,S$GLB,, | Performed by: UROLOGY

## 2023-08-29 PROCEDURE — 1159F MED LIST DOCD IN RCRD: CPT | Mod: CPTII,S$GLB,, | Performed by: UROLOGY

## 2023-08-29 PROCEDURE — 3008F PR BODY MASS INDEX (BMI) DOCUMENTED: ICD-10-PCS | Mod: CPTII,S$GLB,, | Performed by: UROLOGY

## 2023-08-29 PROCEDURE — 99204 PR OFFICE/OUTPT VISIT, NEW, LEVL IV, 45-59 MIN: ICD-10-PCS | Mod: S$GLB,,, | Performed by: UROLOGY

## 2023-08-29 PROCEDURE — 99999 PR PBB SHADOW E&M-EST. PATIENT-LVL IV: ICD-10-PCS | Mod: PBBFAC,,, | Performed by: UROLOGY

## 2023-08-29 PROCEDURE — 99204 OFFICE O/P NEW MOD 45 MIN: CPT | Mod: S$GLB,,, | Performed by: UROLOGY

## 2023-08-29 PROCEDURE — 3074F PR MOST RECENT SYSTOLIC BLOOD PRESSURE < 130 MM HG: ICD-10-PCS | Mod: CPTII,S$GLB,, | Performed by: UROLOGY

## 2023-08-29 RX ORDER — TADALAFIL 20 MG/1
20 TABLET ORAL EVERY OTHER DAY
Qty: 10 TABLET | Refills: 11 | Status: SHIPPED | OUTPATIENT
Start: 2023-08-29 | End: 2024-08-28

## 2023-08-29 NOTE — TELEPHONE ENCOUNTER
Spoke to patient to schedule procedure(s) Colonoscopy       Physician to perform procedure(s) Dr. LON Ignacio  Date of Procedure (s) 10/4/23  Arrival Time 1:15 PM  Time of Procedure(s) 2:15 PM   Location of Procedure(s) [facility] [address]  Type of Rx Prep sent to patient: PEG  Instructions provided to patient via [facility]    Patient was informed on the following information and verbalized understanding. Screening questionnaire reviewed with patient and complete. If procedure requires anesthesia, a responsible adult needs to be present to accompany the patient home, patient cannot drive after receiving anesthesia. Appointment details are tentative, especially check-in time. Patient will receive a prep-op call 4 days prior to confirm check-in time for procedure. If applicable the patient should contact their pharmacy to verify Rx for procedure prep is ready for pick-up. Patient was advised to call the scheduling department at [phone] if pharmacy states no Rx is available. Patient was advised to call the endoscopy scheduling department if any questions or concerns arise.      [facility] Scheduling Department

## 2023-08-29 NOTE — TELEPHONE ENCOUNTER
"----- Message from Todd Mack MD sent at 2023  9:47 AM CDT -----  Procedure: Colonoscopy    Diagnosis: Abdominal pain    Procedure Timin-4 weeks    #If within 4 weeks selected, please nicolas as high priority#    #If greater than 12 weeks, please select "5-12 weeks" and delay sending until 2 months prior to requested date#     Provider: Any endoscopist    Location: No Preference    Additional Scheduling Information: No scheduling concerns    Prep Specifications:Standard prep    Is the patient taking a GLP-1 Agonist:no    Have you attached a patient to this message: yes      "

## 2023-08-29 NOTE — PROGRESS NOTES
Subjective:       Patient ID: Elliott Hernandez is a 42 y.o. male.    Chief Complaint: Benign Prostatic Hypertrophy (Pt discovered enlarged prostate on CT scan. Getting checked. )    HPI patient had an MRI demonstrating prostatomegaly.  He is not having any voiding difficulties.  He also has some mild lower urinary tract symptoms but nothing significant.  Negative family history of prostate cancer but his father passed away from esophageal cancer    Past Medical History:   Diagnosis Date    Sleep apnea        Past Surgical History:   Procedure Laterality Date    INCISION AND DRAINAGE OF HAND Left 7/21/2023    Procedure: INCISION AND DRAINAGE, HAND;  Surgeon: Ira Whitaker MD;  Location: [facility];  Service: Orthopedics;  Laterality: Left;  Mac/Regional    LASIK Bilateral     WISDOM TOOTH EXTRACTION         Family History   Problem Relation Age of Onset    Cancer Father     Heart disease Maternal Grandfather        Social History     Socioeconomic History    Marital status: Single   Tobacco Use    Smoking status: Former     Types: Cigarettes   Substance and Sexual Activity    Alcohol use: Yes     Alcohol/week: 6.0 standard drinks of alcohol     Types: 3 Glasses of wine, 3 Cans of beer per week    Drug use: No       Allergies:  Patient has no known allergies.    Medications:    Current Outpatient Medications:     acetaminophen-caff-dihydrocod 320.5-30-16 mg Cap, Trezix 320.5 mg-30 mg-16 mg capsule  Take 2 capsules every 6 hours by oral route for 7 days., Disp: , Rfl:     allopurinoL (ZYLOPRIM) 100 MG tablet, Take 1.5 tablets (150 mg total) by mouth once daily. (Patient not taking: Reported on 8/21/2023), Disp: 60 tablet, Rfl: 4    allopurinoL (ZYLOPRIM) 300 MG tablet, Take 1 tablet (300 mg total) by mouth once daily. (Patient not taking: Reported on 8/21/2023), Disp: 30 tablet, Rfl: 6    ALPRAZolam (XANAX) 0.5 MG tablet, Take 1 tablet (0.5 mg total) by mouth daily as needed (MRI)., Disp: 2 tablet, Rfl:  0    azithromycin (Z-TIANNA) 250 MG tablet, TAKE 2 TABLETS BY MOUTH EVERY DAY FOR 1 DAY THEN TAKE 1 TABLET BY MOUTH EVERY DAY FOR 4 DAYS, Disp: , Rfl:     colchicine (COLCRYS) 0.6 mg tablet, , Disp: , Rfl:     colchicine (COLCRYS) 0.6 mg tablet, , Disp: , Rfl:     flu vac qs 2020,4 yr up,CD,PF, 60 mcg (15 mcg x 4)/0.5 mL Syrg, ADM 0.5ML IM UTD, Disp: , Rfl:     flu vacc qs 2019-20, 6 mos up, 60 mcg (15 mcg x 4)/0.5 mL Susp, , Disp: , Rfl:     HYDROcodone-acetaminophen (NORCO) 7.5-325 mg per tablet, Take 1 tablet by mouth every 6 (six) hours as needed., Disp: , Rfl:     hydrocodone-chlorpheniramine (TUSSIONEX) 10-8 mg/5 mL suspension, , Disp: , Rfl:     ibuprofen (ADVIL,MOTRIN) 600 MG tablet, Take 1 tablet (600 mg total) by mouth every 6 (six) hours as needed for Pain., Disp: 28 tablet, Rfl: 0    ibuprofen-famotidine (DUEXIS) 800-26.6 mg Tab, Duexis 800 mg-26.6 mg tablet  Take 1 tablet 3 times a day by oral route for 30 days., Disp: , Rfl:     ketoconazole (NIZORAL) 2 % cream, 1 application  once daily., Disp: , Rfl:     luliconazole 1 % Crea, APPLY A THIN LAYER TO THE TO THE AFFECTED AREA PLUS A 1 INCH MARGIN OF HEALTHY SURROUNDING SKIN, Disp: , Rfl:     methylPREDNISolone (MEDROL DOSEPACK) 4 mg tablet, FOLLOW PACKAGE DIRECTIONS, Disp: , Rfl:     montelukast (SINGULAIR) 10 mg tablet, , Disp: , Rfl:     predniSONE (CLEVELAND) 5 mg TbEC, Take 1 tablet by mouth every evening., Disp: , Rfl:     Review of Systems   Constitutional:  Negative for activity change, appetite change, chills, diaphoresis, fatigue, fever and unexpected weight change.   HENT:  Negative for congestion, dental problem, hearing loss, mouth sores, postnasal drip, rhinorrhea, sinus pressure and trouble swallowing.    Eyes:  Negative for pain, discharge and itching.   Respiratory:  Negative for apnea, cough, choking, chest tightness, shortness of breath and wheezing.    Cardiovascular:  Negative for chest pain, palpitations and leg swelling.    Gastrointestinal:  Negative for abdominal distention, abdominal pain, anal bleeding, blood in stool, constipation, diarrhea, nausea, rectal pain and vomiting.   Endocrine: Negative for polydipsia and polyuria.   Genitourinary:  Negative for decreased urine volume, difficulty urinating, dysuria, enuresis, flank pain, frequency, genital sores, hematuria, penile discharge, penile pain, penile swelling, scrotal swelling, testicular pain and urgency.   Musculoskeletal:  Negative for arthralgias, back pain and myalgias.   Skin:  Negative for color change, rash and wound.   Neurological:  Negative for dizziness, syncope, speech difficulty, light-headedness and headaches.   Hematological:  Negative for adenopathy. Does not bruise/bleed easily.   Psychiatric/Behavioral:  Negative for behavioral problems, confusion, hallucinations and sleep disturbance.        Objective:      Physical Exam  Constitutional:       Appearance: He is well-developed.   HENT:      Head: Normocephalic.   Cardiovascular:      Rate and Rhythm: Normal rate.   Pulmonary:      Effort: Pulmonary effort is normal.   Abdominal:      Palpations: Abdomen is soft.   Genitourinary:     Prostate: Normal.      Comments: 30 g benign no nodules  Skin:     General: Skin is warm.   Neurological:      Mental Status: He is alert.         Assessment:       1. Erectile dysfunction, unspecified erectile dysfunction type    2. Enlarged prostate        Plan:       Elliott was seen today for benign prostatic hypertrophy.    Diagnoses and all orders for this visit:    Erectile dysfunction, unspecified erectile dysfunction type    Enlarged prostate  -     Ambulatory referral/consult to Urology

## 2023-08-29 NOTE — PROGRESS NOTES
"  Occupational Therapy Daily Treatment Note     Date: 8/28/2023  Name: Elliott Hernandez  Clinic Number: [id number]    Therapy Diagnosis:   Encounter Diagnoses   Name Primary?    Left hand pain Yes    Left hand weakness      Physician: Maribell Holt PA-C    Physician Orders: Eval and treat - "status post left hand incision and drainage with scar revision by Dr. Whitaker on 7/21/23  2x/wk x 6 wk"  Medical Diagnosis: Post-operative state [Z98.890]  Evaluation Date: 8/8/2023  Plan of Care Certification Date: 11/3/23  Authorization Period: 7/27/23 - 12/31/23  Surgery Date and Procedure:  7/21/23  INCISION AND DRAINAGE, HAND (Left) wound revision scar 5 cm in length, splint application  Date of Return to MD: 9/5/23      Visit #: 3 / 8   Time In: 1:30 pm   Time Out: 2:15 pm      Total Billable Time: 45 mins      Precautions: Standard      Subjective     Pt reports: he reports its uncomfortable when there's pressure through the scar (when he's doing heavier gripping activities). "I'm glad that you guys got the suture out last time"  Response to previous treatment:Regla well    Pain: 0/10    Objective     Bucky received the following manual therapy techniques for 10 minutes in order to maximize tissue function and/or decrease pain:   - Scar massage with and without tool    Bucky received the following direct contact modalities for 8 minutes in order to maximize tissue function and/or decrease pain:   - Patient received ultrasound to dense palmar scar area to increase blood flow, circulation, tissue elasticity, pain management and for wound/scar management for 8 minutes @ 3.3 Mhz, Intensity 0.8 w/cm2 at 100% duty cycle.      Bucky received the following therapeutic exercise for 15 minutes in order to maximize tissue function and/or decrease pain:   - Golf ball rolling  - weight bearing through green putty 2 mins  - green putty squeezes    Bucky received the following therapeutic activity for 12 minutes in order to maximize " tissue function and/or decrease pain:   - fabricated elastomer scar pad and instructed for night time use   - scar maturation timeline  - continued scar massage and weight bearing activities will increase tolerance over time      Home Exercises and Education Provided     Education provided:   - Use of heat  - Progress towards goals     Written Home Exercises Provided: Patient instructed to cont prior HEP.  Exercises were reviewed and Bucky was able to demonstrate them prior to the end of the session.  Bucky demonstrated good  understanding of the HEP provided.   .   See EMR under Patient Instructions for exercises provided 8/10/2023.        Assessment     Bucky tolerated OT session well. One small scabbed area from last week's suture removal is completely healed. Incision fully headed with no sign of infection. Dense scar tissue present. He is near his prior level of function with some reports of discomfort with heavier gripping activities. Added weightbearing through the palm and he tolerated well. Provided elastomer scar pad and he verbalizes good understanding of use.     Bucky is progressing well towards his goals and there are no updates to goals at this time. Pt prognosis is Good.     Pt will continue to benefit from skilled outpatient occupational therapy to address the deficits listed in the problem list on initial evaluation provide pt/family education and to maximize pt's level of independence in the home and community environment.     Anticipated barriers to therapy: None      Pt's spiritual, cultural and educational needs considered and pt agreeable to plan of care and goals.    Goals:   LTG's (12 weeks):  1)   Increase thumb MP flexion by 15-20 degrees Progressing  2)   Demonstrate 70-90 psi's of left  strength to grasp heavier household objects. Progressing   3)   Demonstrate 15-20 psi's of lateral pinch to independently manipulate heavier fine motor tasks. Progressing  4)   Decrease complaints of pain  to  1 out of 10 at worst to increase functional hand use for ADL/work/leisure activities. Progressing  5)   Patient to score at 20% or less on FOTO to demonstrate improved perception of functional left upper extremity use. Progressing  6)   Pt will return to near to prior level of function for ADLs and household management reporting I or Mod I with ADLs. Progressing  7) Demonstrate left thumb opposition to DPC. Progressing     STG's (4 weeks)  1)   Patient to be IND with HEP and modalities for pain/edema managment. Progressing  2)   Increase thumb MP flexion by 5-10 degrees. Progressing   3)   Demonstrate 20-30 psi's of left  strength to improve functional grasp for ADLs/work/leisure activities. Progressing  4)   Demonstrate 7-10 psi's of left lateral pinch to increase independence with button and FM Coordination. Progressing  5)   Patient to be IND with Orthotic use, wear and care precautions. Progressing       Plan     Cont OT to address above goals.        Haritha Watson, OTR/L

## 2023-08-30 ENCOUNTER — CLINICAL SUPPORT (OUTPATIENT)
Dept: REHABILITATION | Facility: HOSPITAL | Age: 43
End: 2023-08-30
Payer: COMMERCIAL

## 2023-08-30 DIAGNOSIS — R29.898 LEFT HAND WEAKNESS: ICD-10-CM

## 2023-08-30 DIAGNOSIS — M79.642 LEFT HAND PAIN: Primary | ICD-10-CM

## 2023-08-30 PROCEDURE — 97018 PARAFFIN BATH THERAPY: CPT | Mod: 59

## 2023-08-30 PROCEDURE — 97110 THERAPEUTIC EXERCISES: CPT

## 2023-08-30 PROCEDURE — 97140 MANUAL THERAPY 1/> REGIONS: CPT

## 2023-08-30 PROCEDURE — 97035 APP MDLTY 1+ULTRASOUND EA 15: CPT

## 2023-08-30 NOTE — PROGRESS NOTES
"  Occupational Therapy Daily Treatment Note     Date: 8/30/2023  Name: Elliott Hernandez  Clinic Number: [id number]    Therapy Diagnosis:   Encounter Diagnoses   Name Primary?    Left hand pain Yes    Left hand weakness      Physician: Maribell Holt PA-C    Physician Orders: Eval and treat - "status post left hand incision and drainage with scar revision by Dr. Whitaker on 7/21/23  2x/wk x 6 wk"  Medical Diagnosis: Post-operative state [Z98.890]  Evaluation Date: 8/8/2023  Plan of Care Certification Date: 11/3/23  Authorization Period: 7/27/23 - 12/31/23  Surgery Date and Procedure:  7/21/23  INCISION AND DRAINAGE, HAND (Left) wound revision scar 5 cm in length, splint application  Date of Return to MD: 9/5/23      Visit #: 4 / 8   Time In: 1:30 pm   Time Out: 2:15 pm      Total Billable Time: 45 mins      Precautions: Standard      Subjective     Pt reports: he reports its uncomfortable when there's pressure through the scar (when he's doing heavier gripping activities). "I'm glad that you guys got the suture out last time"  Response to previous treatment:Regla well    Pain: 0/10    Objective     Mental status: alert, oriented x3     Observation:   Hypertrophic scarring. One small suture fragment remains.         Wound Assessment:   7.5 thenar   2 cm     Edema: Circumferential measurements: In centimters       8/8/2023 8/8/2023     Left Right   Thumb:       Prox. Phalanx 7 6.7        Left  8/8/2023 Right   8/8/2023   MPs 23.4 cm 24.2 cm            Range of Motion:      Elbow and Wrist ROM. Measured in degrees.    8/9/2023 8/9/2023     Left  Right    Wrist Ext/Flex 65 / 60 60 / 65   Wrist RD/UD nt nt         Hand ROM. Measured in degrees.    8/9/2023 8/30/23 8/9/2023     Left  Left  Right             Thumb: MP 30 38 50                IP 73 79 78       Rad ADD/ABD 65 nt 60       Pal ADD/ABD 50 nt 50          Opposition To base of sf To A1 pulley area To dpc          Strength: (JACKSON Dynamometer in psi.)       "  8/8/2023 8/30/23 8/8/2023     Left Left  Right   Rung II nt 85 100         Pinch Strength (Measured in psi)       8/8/2023 8/30/23 8/8/2023     Left Left  Right   Key Pinch nt  16.5 21    3pt Pinch nt  15 15    2pt Pinch nt  10 13.5          Limitation/Restriction for FOTO Survey     Therapist reviewed FOTO scores for Elliott Hernandez on 8/8/2023.   FOTO documents entered into [employer] - see Media section.     Limitation Score: needs foto next visit            Bucky received the following supervised modalities for 10 minutes in order to maximize tissue function and/or decrease pain:   - Paraffin and MH pack       Bucky received the following manual therapy techniques for 8 minutes in order to maximize tissue function and/or decrease pain:   - Scar massage with and without tool    Bucky received the following direct contact modalities for 8 minutes in order to maximize tissue function and/or decrease pain:   - Patient received ultrasound to dense palmar scar area to increase blood flow, circulation, tissue elasticity, pain management and for wound/scar management for 8 minutes @ 3.3 Mhz, Intensity 0.8 w/cm2 at 100% duty cycle.      Bucky received the following therapeutic exercise for 19 minutes in order to maximize tissue function and/or decrease pain:   - Golf ball rolling  - green flexbar rolling 2 mins  - red theraball toss and catch  - gripper 3rd rung pom pom pick ups 1 container         Home Exercises and Education Provided     Education provided:   - Use of heat  - Progress towards goals     Written Home Exercises Provided: Patient instructed to cont prior HEP.  Exercises were reviewed and Bucky was able to demonstrate them prior to the end of the session.  Bucky demonstrated good  understanding of the HEP provided.   .   See EMR under Patient Instructions for exercises provided 8/10/2023.        Assessment     Bucky tolerated OT session well. Assessed  and pinch on affected side for the first time. He  demos weakness compared to the unaffected side, as expected. Thumb MCP and IP active range of motion increased. Added new strengthening ax and he tolerated well. Good response to US and massage - he reports softer scar tissue post-tx. Discussed dropping down to once a week and he agrees. He has met all STGs.     Bucky is progressing well towards his goals and there are no updates to goals at this time. Pt prognosis is Good.     Pt will continue to benefit from skilled outpatient occupational therapy to address the deficits listed in the problem list on initial evaluation provide pt/family education and to maximize pt's level of independence in the home and community environment.     Anticipated barriers to therapy: None      Pt's spiritual, cultural and educational needs considered and pt agreeable to plan of care and goals.    Goals:   LTG's (12 weeks):  1)   Increase thumb MP flexion by 15-20 degrees Progressing  2)   Demonstrate 70-90 psi's of left  strength to grasp heavier household objects. -met 8/30  3)   Demonstrate 15-20 psi's of lateral pinch to independently manipulate heavier fine motor tasks.  -met 8/30  4)   Decrease complaints of pain to  1 out of 10 at worst to increase functional hand use for ADL/work/leisure activities. -met 8/30  5)   Patient to score at 20% or less on FOTO to demonstrate improved perception of functional left upper extremity use. Progressing  6)   Pt will return to near to prior level of function for ADLs and household management reporting I or Mod I with ADLs.  -met 8/30  7) Demonstrate left thumb opposition to DPC. Progressing     STG's (4 weeks)  1)   Patient to be IND with HEP and modalities for pain/edema managment. -met 8/30  2)   Increase thumb MP flexion by 5-10 degrees.  -met 8/30  3)   Demonstrate 20-30 psi's of left  strength to improve functional grasp for ADLs/work/leisure activities. -met 8/30  4)   Demonstrate 7-10 psi's of left lateral pinch to increase  independence with button and FM Coordination.  -met 8/30  5)   Patient to be IND with Orthotic use, wear and care precautions. -met 8/30       Plan     Cont OT to address above goals.        Haritha Watson, OTR/L

## 2023-09-01 ENCOUNTER — TELEPHONE (OUTPATIENT)
Dept: ORTHOPEDICS | Facility: CLINIC | Age: 43
End: 2023-09-01
Payer: COMMERCIAL

## 2023-09-05 ENCOUNTER — TELEPHONE (OUTPATIENT)
Dept: HEPATOLOGY | Facility: CLINIC | Age: 43
End: 2023-09-05
Payer: COMMERCIAL

## 2023-09-05 ENCOUNTER — OFFICE VISIT (OUTPATIENT)
Dept: ORTHOPEDICS | Facility: CLINIC | Age: 43
End: 2023-09-05
Payer: COMMERCIAL

## 2023-09-05 VITALS — WEIGHT: 238.13 LBS | BODY MASS INDEX: 29.61 KG/M2 | HEIGHT: 75 IN

## 2023-09-05 DIAGNOSIS — Z98.890 POST-OPERATIVE STATE: Primary | ICD-10-CM

## 2023-09-05 PROCEDURE — 3008F PR BODY MASS INDEX (BMI) DOCUMENTED: ICD-10-PCS | Mod: CPTII,S$GLB,, | Performed by: PHYSICIAN ASSISTANT

## 2023-09-05 PROCEDURE — 99999 PR PBB SHADOW E&M-EST. PATIENT-LVL III: ICD-10-PCS | Mod: PBBFAC,,, | Performed by: PHYSICIAN ASSISTANT

## 2023-09-05 PROCEDURE — 99024 POSTOP FOLLOW-UP VISIT: CPT | Mod: S$GLB,,, | Performed by: PHYSICIAN ASSISTANT

## 2023-09-05 PROCEDURE — 99024 PR POST-OP FOLLOW-UP VISIT: ICD-10-PCS | Mod: S$GLB,,, | Performed by: PHYSICIAN ASSISTANT

## 2023-09-05 PROCEDURE — 1159F MED LIST DOCD IN RCRD: CPT | Mod: CPTII,S$GLB,, | Performed by: PHYSICIAN ASSISTANT

## 2023-09-05 PROCEDURE — 3044F HG A1C LEVEL LT 7.0%: CPT | Mod: CPTII,S$GLB,, | Performed by: PHYSICIAN ASSISTANT

## 2023-09-05 PROCEDURE — 99999 PR PBB SHADOW E&M-EST. PATIENT-LVL III: CPT | Mod: PBBFAC,,, | Performed by: PHYSICIAN ASSISTANT

## 2023-09-05 PROCEDURE — 3008F BODY MASS INDEX DOCD: CPT | Mod: CPTII,S$GLB,, | Performed by: PHYSICIAN ASSISTANT

## 2023-09-05 PROCEDURE — 3044F PR MOST RECENT HEMOGLOBIN A1C LEVEL <7.0%: ICD-10-PCS | Mod: CPTII,S$GLB,, | Performed by: PHYSICIAN ASSISTANT

## 2023-09-05 PROCEDURE — 1159F PR MEDICATION LIST DOCUMENTED IN MEDICAL RECORD: ICD-10-PCS | Mod: CPTII,S$GLB,, | Performed by: PHYSICIAN ASSISTANT

## 2023-09-05 NOTE — TELEPHONE ENCOUNTER
Returned call to the patient.  Patient is scheduled with Ms. Skaggs, but patient is requesting to seen by one of our MD's.  Scheduled to the next available appointment with Dr. Brady 9/8/2023.  Patient confirmed and agreed with the appointment.  Reminder letter mailed.

## 2023-09-05 NOTE — PROGRESS NOTES
"Mr. Hernandez is here today for a post-operative visit.  He is 6 weeks status post left hand incision and drainage with scar revision by Dr. Whitaker on 7/21/23. He reports that he is doing great. Incision is well healed and he has returned to regular ADLs. Occasional slight sensitivity at the incision. Cultures as below, previously seen by Infectious Disease. He denies fever, chills, and sweats since the time of the surgery.     Physical exam:    Vitals:    09/05/23 0936   Weight: 108 kg (238 lb 1.6 oz)   Height: 6' 3" (1.905 m)   PainSc: 0-No pain         Vital signs are stable, patient is afebrile.  Patient is well dressed and well groomed, no acute distress.  Alert and oriented to person, place, and time.  Incision is well healed. No drainage. There is no erythema or exudate.  There is no sign of any infection. He is NVI. Full ROM.      Cultures   Aerobic Bacterial Culture  Abnormal   LECLERCIA ADECARBOXYLATA   Rare   Skin doug also present     Resulting Agency [facility]        Susceptibility     Leclercia adecarboxylata     CULTURE, AEROBIC  (SPECIFY SOURCE)     Amox/K Clav'ate <=8/4 mcg/mL Sensitive     Amp/Sulbactam <=8/4 mcg/mL Sensitive     Ampicillin <=8 mcg/mL Sensitive     Cefazolin <=2 mcg/mL Sensitive     Cefepime <=2 mcg/mL Sensitive     Ceftriaxone <=1 mcg/mL Sensitive     Ciprofloxacin <=1 mcg/mL Sensitive     Ertapenem <=0.5 mcg/mL Sensitive     Gentamicin <=4 mcg/mL Sensitive     Levofloxacin <=2 mcg/mL Sensitive     Meropenem <=1 mcg/mL Sensitive     Piperacillin/Tazo <=16 mcg/mL Sensitive     Tetracycline <=4 mcg/mL Sensitive     Tobramycin <=4 mcg/mL Sensitive     Trimeth/Sulfa <=2/38 mcg/mL Sensitive                 Assessment:  status post left hand incision and drainage with scar revision by Dr. Whitaker on 7/21/23    Plan:  Elliott was seen today for post-op evaluation.    Diagnoses and all orders for this visit:    Post-operative state      PO instruction reviewed and provided to " patient  Continue HEP, scar work   RTC if needed

## 2023-09-05 NOTE — TELEPHONE ENCOUNTER
----- Message from Angelina Stockton sent at 9/5/2023  8:11 AM CDT -----  Pt has a referral for Hepatomegaly. Pt would like to be seen sooner than 9/28. Pt can be reached at [phone].    Thank you

## 2023-09-08 ENCOUNTER — OFFICE VISIT (OUTPATIENT)
Dept: HEPATOLOGY | Facility: CLINIC | Age: 43
End: 2023-09-08
Payer: COMMERCIAL

## 2023-09-08 VITALS
RESPIRATION RATE: 18 BRPM | DIASTOLIC BLOOD PRESSURE: 73 MMHG | SYSTOLIC BLOOD PRESSURE: 140 MMHG | BODY MASS INDEX: 29.61 KG/M2 | OXYGEN SATURATION: 97 % | TEMPERATURE: 98 F | HEIGHT: 75 IN | WEIGHT: 238.13 LBS | HEART RATE: 78 BPM

## 2023-09-08 DIAGNOSIS — R74.8 ABNORMAL LIVER ENZYMES: ICD-10-CM

## 2023-09-08 DIAGNOSIS — K75.81 NASH (NONALCOHOLIC STEATOHEPATITIS): ICD-10-CM

## 2023-09-08 DIAGNOSIS — R16.0 HEPATOMEGALY: ICD-10-CM

## 2023-09-08 LAB
ACID FAST MOD KINY STN SPEC: NORMAL
ACID FAST MOD KINY STN SPEC: NORMAL
MYCOBACTERIUM SPEC QL CULT: NORMAL
MYCOBACTERIUM SPEC QL CULT: NORMAL

## 2023-09-08 PROCEDURE — 1159F MED LIST DOCD IN RCRD: CPT | Mod: CPTII,S$GLB,, | Performed by: INTERNAL MEDICINE

## 2023-09-08 PROCEDURE — 3077F PR MOST RECENT SYSTOLIC BLOOD PRESSURE >= 140 MM HG: ICD-10-PCS | Mod: CPTII,S$GLB,, | Performed by: INTERNAL MEDICINE

## 2023-09-08 PROCEDURE — 3008F BODY MASS INDEX DOCD: CPT | Mod: CPTII,S$GLB,, | Performed by: INTERNAL MEDICINE

## 2023-09-08 PROCEDURE — 99999 PR PBB SHADOW E&M-EST. PATIENT-LVL IV: CPT | Mod: PBBFAC,,, | Performed by: INTERNAL MEDICINE

## 2023-09-08 PROCEDURE — 99214 PR OFFICE/OUTPT VISIT, EST, LEVL IV, 30-39 MIN: ICD-10-PCS | Mod: S$GLB,,, | Performed by: INTERNAL MEDICINE

## 2023-09-08 PROCEDURE — 99999 PR PBB SHADOW E&M-EST. PATIENT-LVL IV: ICD-10-PCS | Mod: PBBFAC,,, | Performed by: INTERNAL MEDICINE

## 2023-09-08 PROCEDURE — 3078F PR MOST RECENT DIASTOLIC BLOOD PRESSURE < 80 MM HG: ICD-10-PCS | Mod: CPTII,S$GLB,, | Performed by: INTERNAL MEDICINE

## 2023-09-08 PROCEDURE — 99214 OFFICE O/P EST MOD 30 MIN: CPT | Mod: S$GLB,,, | Performed by: INTERNAL MEDICINE

## 2023-09-08 PROCEDURE — 1160F RVW MEDS BY RX/DR IN RCRD: CPT | Mod: CPTII,S$GLB,, | Performed by: INTERNAL MEDICINE

## 2023-09-08 PROCEDURE — 3078F DIAST BP <80 MM HG: CPT | Mod: CPTII,S$GLB,, | Performed by: INTERNAL MEDICINE

## 2023-09-08 PROCEDURE — 1159F PR MEDICATION LIST DOCUMENTED IN MEDICAL RECORD: ICD-10-PCS | Mod: CPTII,S$GLB,, | Performed by: INTERNAL MEDICINE

## 2023-09-08 PROCEDURE — 3044F PR MOST RECENT HEMOGLOBIN A1C LEVEL <7.0%: ICD-10-PCS | Mod: CPTII,S$GLB,, | Performed by: INTERNAL MEDICINE

## 2023-09-08 PROCEDURE — 3008F PR BODY MASS INDEX (BMI) DOCUMENTED: ICD-10-PCS | Mod: CPTII,S$GLB,, | Performed by: INTERNAL MEDICINE

## 2023-09-08 PROCEDURE — 3044F HG A1C LEVEL LT 7.0%: CPT | Mod: CPTII,S$GLB,, | Performed by: INTERNAL MEDICINE

## 2023-09-08 PROCEDURE — 3077F SYST BP >= 140 MM HG: CPT | Mod: CPTII,S$GLB,, | Performed by: INTERNAL MEDICINE

## 2023-09-08 PROCEDURE — 1160F PR REVIEW ALL MEDS BY PRESCRIBER/CLIN PHARMACIST DOCUMENTED: ICD-10-PCS | Mod: CPTII,S$GLB,, | Performed by: INTERNAL MEDICINE

## 2023-09-08 RX ORDER — FLUTICASONE PROPIONATE 50 MCG
SPRAY, SUSPENSION (ML) NASAL
COMMUNITY

## 2023-09-08 NOTE — PROGRESS NOTES
Hepatology Consult Note    Referring provider: Dr. Todd Mack  PCP: Susan Rajan MD    Chief complaint: elevated liver enzymes, hepatomegaly     HPI:  Elliott Hernandez is a 42 y.o. male with history of CHUCKY on CPAP, gout, who was referred to Hepatology Clinic for evaluation of elevated liver enzymes and hepatomegaly. The patient reports first being told about having an issue with the liver in August 2022 after having an MRI done for evaluation of RLQ abdominal fullness and discomfort.     Regarding risk factors of liver disease, reports issues with weight gain in the last couple of years. Although he has lost ~10lb in the last month after changing his diet by avoiding carbohydrates, processed foods, and limiting EtOH intake. Regarding EtOH intake, reports previously drinking wine on the weekends, never on a daily basis. Drank heavily during his college years. Reports illicit drug use in college, but denies intravenous drug use. Has tattoos, which were done professionally. Denies prior blood transfusions.     Reports history of EtOH abuse and cirrhosis in paternal uncle and grandfather.    The patient denies signs/symptoms of decompensated liver disease, including increased abdominal girth, encephalopathy, jaundice, gross GI bleeding. The patient denies prior evaluation by hepatologist, liver biopsy, paracentesis.    Past Medical History:   Diagnosis Date    Sleep apnea        Past Surgical History:   Procedure Laterality Date    INCISION AND DRAINAGE OF HAND Left 7/21/2023    Procedure: INCISION AND DRAINAGE, HAND;  Surgeon: Ira Whitaker MD;  Location: [facility];  Service: Orthopedics;  Laterality: Left;  Mac/Regional    LASIK Bilateral     WISDOM TOOTH EXTRACTION         Family History   Problem Relation Age of Onset    Cancer Father     Heart disease Maternal Grandfather        Social History     Tobacco Use    Smoking status: Former     Types: Cigarettes   Substance Use Topics    Alcohol  use: Yes     Alcohol/week: 6.0 standard drinks of alcohol     Types: 3 Glasses of wine, 3 Cans of beer per week    Drug use: No       Current Outpatient Medications   Medication Sig Dispense Refill    allopurinoL (ZYLOPRIM) 100 MG tablet Take 1.5 tablets (150 mg total) by mouth once daily. 60 tablet 4    allopurinoL (ZYLOPRIM) 300 MG tablet Take 1 tablet (300 mg total) by mouth once daily. 30 tablet 6    ALPRAZolam (XANAX) 0.5 MG tablet Take 1 tablet (0.5 mg total) by mouth daily as needed (MRI). 2 tablet 0    fluticasone propionate (FLONASE) 50 mcg/actuation nasal spray fluticasone propionate 50 mcg/actuation nasal spray,suspension   SHAKE LQ AND U 2 SPRAYS IEN QD      tadalafiL (CIALIS) 20 MG Tab Take 1 tablet (20 mg total) by mouth every other day. Take every other day as needed 10 tablet 11    acetaminophen-caff-dihydrocod 320.5-30-16 mg Cap Trezix 320.5 mg-30 mg-16 mg capsule   Take 2 capsules every 6 hours by oral route for 7 days.      azithromycin (Z-TIANNA) 250 MG tablet TAKE 2 TABLETS BY MOUTH EVERY DAY FOR 1 DAY THEN TAKE 1 TABLET BY MOUTH EVERY DAY FOR 4 DAYS      colchicine (COLCRYS) 0.6 mg tablet       colchicine (COLCRYS) 0.6 mg tablet       flu vac qs 2020,4 yr up,CD,PF, 60 mcg (15 mcg x 4)/0.5 mL Syrg ADM 0.5ML IM UTD      flu vacc qs 2019-20, 6 mos up, 60 mcg (15 mcg x 4)/0.5 mL Susp       HYDROcodone-acetaminophen (NORCO) 7.5-325 mg per tablet Take 1 tablet by mouth every 6 (six) hours as needed.      hydrocodone-chlorpheniramine (TUSSIONEX) 10-8 mg/5 mL suspension       ibuprofen (ADVIL,MOTRIN) 600 MG tablet Take 1 tablet (600 mg total) by mouth every 6 (six) hours as needed for Pain. 28 tablet 0    ibuprofen-famotidine (DUEXIS) 800-26.6 mg Tab Duexis 800 mg-26.6 mg tablet   Take 1 tablet 3 times a day by oral route for 30 days.      ketoconazole (NIZORAL) 2 % cream 1 application  once daily.      luliconazole 1 % Crea APPLY A THIN LAYER TO THE TO THE AFFECTED AREA PLUS A 1 INCH MARGIN OF HEALTHY  "SURROUNDING SKIN      methylPREDNISolone (MEDROL DOSEPACK) 4 mg tablet FOLLOW PACKAGE DIRECTIONS      montelukast (SINGULAIR) 10 mg tablet       predniSONE (CLEVELAND) 5 mg TbEC Take 1 tablet by mouth every evening.       No current facility-administered medications for this visit.       Review of patient's allergies indicates:  No Known Allergies         Vitals:    09/08/23 1056   BP: (!) 140/73   Pulse: 78   Resp: 18   Temp: 98 °F (36.7 °C)   TempSrc: Oral   SpO2: 97%   Weight: 108 kg (238 lb 1.6 oz)   Height: 6' 3" (1.905 m)   Body mass index is 29.76 kg/m².      Physical Exam  Constitutional:       General: He is not in acute distress.  Eyes:      General: No scleral icterus.  Cardiovascular:      Rate and Rhythm: Normal rate and regular rhythm.   Pulmonary:      Effort: Pulmonary effort is normal.      Breath sounds: Normal breath sounds.   Abdominal:      General: Abdomen is flat. There is no distension.      Palpations: Abdomen is soft. There is hepatomegaly. There is no fluid wave or splenomegaly.      Tenderness: There is no abdominal tenderness.   Musculoskeletal:      Right lower leg: No edema.      Left lower leg: No edema.   Skin:     General: Skin is warm.      Comments: No spider angiomas. No palmar erythema. No leukonychia.    Neurological:      General: No focal deficit present.      Mental Status: He is alert and oriented to person, place, and time.   Psychiatric:         Behavior: Behavior is cooperative.         Thought Content: Thought content normal.         LABS: I personally reviewed pertinent laboratory findings.    Lab Results   Component Value Date    WBC 6.09 08/21/2023    HGB 16.6 08/21/2023    HCT 50.0 08/21/2023    MCV 86 08/21/2023     08/21/2023       Lab Results   Component Value Date     08/15/2023    K 3.8 08/15/2023     08/15/2023    CO2 25 08/15/2023    BUN 11 08/15/2023    CREATININE 1.0 08/15/2023    CALCIUM 9.3 08/15/2023    ANIONGAP 13 08/15/2023       Lab " Results   Component Value Date    ALT 53 (H) 08/15/2023    AST 35 08/15/2023    ALKPHOS 54 (L) 08/15/2023    BILITOT 1.0 08/15/2023       Lab Results   Component Value Date    HEPBCAB Non-reactive 02/17/2023    HEPCAB Non-reactive 02/17/2023     KERA negative (2/17/2023)    IMAGING: I personally reviewed imaging studies.    MRI Abdomen with and without contrast 8/22/2023  FINDINGS:  Inferior Thorax: Unremarkable.     Liver: Enlarged measuring 20 cm in craniocaudal dimension.  Signal dropout on out of phase images reflecting steatosis.  There is a 2 cm left hepatic lobe mildly T2 hyperintense lesion demonstrating peripheral and progressive enhancement compatible with a hemangioma (sequence 15 image 34).  Few additional scattered nonenhancing T2 hyperintensities, likely representing small cysts.     Gallbladder: Unremarkable.     Bile Ducts: No dilatation.     Pancreas: No mass or ductal dilatation.     Spleen: Normal in size.     Adrenals: No focal lesions.     Kidneys/Ureters: No mass or hydronephrosis.     GI Tract/Mesentery: No evidence of bowel obstruction or inflammation.     Peritoneal Space: No ascites.     Retroperitoneum: No pathologically enlarged nodes.     Abdominal wall: Unremarkable.     Vasculature: The abdominal aorta is normal in caliber.  The hepatic and portal veins are patent.     Bones: Small L1-T2 hyperintensity which does not enhance favoring a small benign osseous hemangioma.  No marrow replacing process.       Assessment:  Elliott Hernandez is a 42 y.o. male with history of CHUCKY on CPAP, gout, who was referred to Hepatology Clinic for evaluation of mildly elevated liver enzymes in hepatocellular pattern (ALT > AST, ALT 1.2x ULN) and hepatomegaly with steatosis. Presentation seems most consistent with non-alcoholic steatohepatitis given risk factors (overweight, CHUCKY), LFTs pattern (ALT>AST) and fatty infiltration on imaging. Fortunately, there is no evidence of cirrhosis, hepatic  synthetic dysfunction or portal hypertension.     1. Hepatomegaly    2. Abnormal liver enzymes    3. HARDIN (nonalcoholic steatohepatitis)      Recommendations:  - f/u LFTs, INR  - f/u HAV IgG, HBsAb, HIV  - If not immune, then vaccinate against HAV and HBV   - Per pt's preference, defer non-invasive measures of fibrosis  - Counseled on continued weight loss (goal 5-10% body weight) and exercise   - Discussed Mediterranean diet as an option for achieving weight loss goals and liver health    Return to clinic as needed.    I have sent communication to the referring physician and/or primary care provider.    Mercy Brady MD  Staff Physician  Hepatology and Liver Transplant  [facility]  [facility]

## 2023-09-08 NOTE — PATIENT INSTRUCTIONS
Labs today   Continue weight loss (goal 5 - 10% of body weight)  Limit alcohol intake to max 2 standard drinks a day  RTC as needed

## 2023-09-11 ENCOUNTER — CLINICAL SUPPORT (OUTPATIENT)
Dept: REHABILITATION | Facility: HOSPITAL | Age: 43
End: 2023-09-11
Payer: COMMERCIAL

## 2023-09-11 ENCOUNTER — LAB VISIT (OUTPATIENT)
Dept: LAB | Facility: HOSPITAL | Age: 43
End: 2023-09-11
Attending: INTERNAL MEDICINE
Payer: COMMERCIAL

## 2023-09-11 DIAGNOSIS — R16.0 HEPATOMEGALY: ICD-10-CM

## 2023-09-11 DIAGNOSIS — R29.898 LEFT HAND WEAKNESS: ICD-10-CM

## 2023-09-11 DIAGNOSIS — M79.642 LEFT HAND PAIN: Primary | ICD-10-CM

## 2023-09-11 LAB
ALBUMIN SERPL BCP-MCNC: 4.4 G/DL (ref 3.5–5.2)
ALP SERPL-CCNC: 52 U/L (ref 55–135)
ALT SERPL W/O P-5'-P-CCNC: 42 U/L (ref 10–44)
AST SERPL-CCNC: 27 U/L (ref 10–40)
BILIRUB DIRECT SERPL-MCNC: 0.2 MG/DL (ref 0.1–0.3)
BILIRUB SERPL-MCNC: 0.7 MG/DL (ref 0.1–1)
HAV IGG SER QL IA: NORMAL
HIV 1+2 AB+HIV1 P24 AG SERPL QL IA: NORMAL
INR PPP: 1 (ref 0.8–1.2)
PROT SERPL-MCNC: 7.3 G/DL (ref 6–8.4)
PROTHROMBIN TIME: 11.1 SEC (ref 9–12.5)

## 2023-09-11 PROCEDURE — 97018 PARAFFIN BATH THERAPY: CPT

## 2023-09-11 PROCEDURE — 36415 COLL VENOUS BLD VENIPUNCTURE: CPT | Mod: PO | Performed by: INTERNAL MEDICINE

## 2023-09-11 PROCEDURE — 85610 PROTHROMBIN TIME: CPT | Performed by: INTERNAL MEDICINE

## 2023-09-11 PROCEDURE — 86790 VIRUS ANTIBODY NOS: CPT | Performed by: INTERNAL MEDICINE

## 2023-09-11 PROCEDURE — 80076 HEPATIC FUNCTION PANEL: CPT | Performed by: INTERNAL MEDICINE

## 2023-09-11 PROCEDURE — 97530 THERAPEUTIC ACTIVITIES: CPT

## 2023-09-11 PROCEDURE — 87389 HIV-1 AG W/HIV-1&-2 AB AG IA: CPT | Performed by: INTERNAL MEDICINE

## 2023-09-11 NOTE — PROGRESS NOTES
"  Occupational Therapy Daily Treatment Note / Discharge Summary     Date: 9/11/2023  Name: Elliott Hernandez  Clinic Number: [id number]    Therapy Diagnosis:   Encounter Diagnoses   Name Primary?    Left hand pain Yes    Left hand weakness      Physician: Maribell Holt PA-C    Physician Orders: Eval and treat - "status post left hand incision and drainage with scar revision by Dr. Whitaker on 7/21/23  2x/wk x 6 wk"  Medical Diagnosis: Post-operative state [Z98.890]  Evaluation Date: 8/8/2023  Plan of Care Certification Date: 11/3/23  Authorization Period: 7/27/23 - 12/31/23  Surgery Date and Procedure:  7/21/23  INCISION AND DRAINAGE, HAND (Left) wound revision scar 5 cm in length, splint application  Date of Return to MD: 9/5/23      Visit #: 5 / 8   Time In: 2:00 pm   Time Out: 2:30 pm      Total Billable Time: 30 mins      Precautions: Standard      Subjective     Pt reports: he reports he is using it more and he uses gloves when he works with sharp objects.   Response to previous treatment:Regla well    Pain: 0/10    Objective     Mental status: alert, oriented x3     Observation:   Hypertrophic scarring.         Wound Assessment:   7.5 thenar   2 cm     Edema: Circumferential measurements: In centimters       8/8/2023 8/8/2023 9/11/2023     Left Right Left   Thumb:        Prox. Phalanx 7 6.7 6.3        Left  8/8/2023 Right   8/8/2023 9/11/2023    MPs 23.4 cm 24.2 cm 22.5             Range of Motion:      Elbow and Wrist ROM. Measured in degrees.    8/9/2023 8/9/2023     Left  Right    Wrist Ext/Flex 65 / 60 60 / 65   Wrist RD/UD nt nt         Hand ROM. Measured in degrees.    8/9/2023 8/30/23 8/9/2023 9/11/23     Left  Left  Right  Left              Thumb: MP 30 38 50 45                IP 73 79 78 82       Rad ADD/ABD 65 nt 60 70       Pal ADD/ABD 50 nt 50 50          Opposition To base of sf To A1 pulley area To dpc           Strength: (JACKSON Dynamometer in psi.)        8/8/2023 8/30/23 8/8/2023 " 9/11/23      Left Left  Right Left    Rung II nt 85 100 103          Pinch Strength (Measured in psi)       8/8/2023 8/30/23 8/8/2023 9/11/23     Left Left  Right Left    Key Pinch nt  16.5 21  20   3pt Pinch nt  15 15  14   2pt Pinch nt  10 13.5  13         Limitation/Restriction for FOTO Survey     Therapist reviewed FOTO scores for Elliott Hernandez on 8/8/2023.   FOTO documents entered into [facility] - see Media section.     Limitation Score: needs foto next visit            Bucky received the following supervised modalities for 10 minutes in order to maximize tissue function and/or decrease pain:   - Paraffin and MH pack     Bucky received the following therapeutic activities for 20 minutes in order to maximize tissue function and/or decrease pain:   - Re-assess objective measures  - Review final HEP      Home Exercises and Education Provided     Education provided:   - Use of heat  - Progress towards goals     Written Home Exercises Provided: Patient instructed to cont prior HEP.  Exercises were reviewed and Bucky was able to demonstrate them prior to the end of the session.  Bucky demonstrated good  understanding of the HEP provided.   .   See EMR under Patient Instructions for exercises provided 8/10/2023.        Assessment     Bucky tolerated OT session well. Assessed  and pinch on affected side for the last time. He strength has improved on the affected side. Thumb MCP and IP active range of motion increased. He has reached maximum potential for skilled OT services at this point. Agreed to discharge with final home exercise program in place.      Pt's spiritual, cultural and educational needs considered and pt agreeable to plan of care and goals.    Goals:   LTG's (12 weeks):  1)   Increase thumb MP flexion by 15-20 degrees -met 9/11  2)   Demonstrate 70-90 psi's of left  strength to grasp heavier household objects. -met 8/30  3)   Demonstrate 15-20 psi's of lateral pinch to independently manipulate  heavier fine motor tasks.  -met 8/30  4)   Decrease complaints of pain to  1 out of 10 at worst to increase functional hand use for ADL/work/leisure activities. -met 8/30  5)   Patient to score at 20% or less on FOTO to demonstrate improved perception of functional left upper extremity use. Progressing  6)   Pt will return to near to prior level of function for ADLs and household management reporting I or Mod I with ADLs.  -met 8/30  7) Demonstrate left thumb opposition to DPC. Progressing     STG's (4 weeks)  1)   Patient to be IND with HEP and modalities for pain/edema managment. -met 8/30  2)   Increase thumb MP flexion by 5-10 degrees.  -met 8/30  3)   Demonstrate 20-30 psi's of left  strength to improve functional grasp for ADLs/work/leisure activities. -met 8/30  4)   Demonstrate 7-10 psi's of left lateral pinch to increase independence with button and FM Coordination.  -met 8/30  5)   Patient to be IND with Orthotic use, wear and care precautions. -met 8/30       Plan     Discharge from OT due to progress towards goals.        Haritha Watson OTR/L

## 2023-09-12 ENCOUNTER — OFFICE VISIT (OUTPATIENT)
Dept: INTERNAL MEDICINE | Facility: CLINIC | Age: 43
End: 2023-09-12
Payer: COMMERCIAL

## 2023-09-12 DIAGNOSIS — R19.03 ABDOMINAL RIGHT LOWER QUADRANT SWELLING: Primary | ICD-10-CM

## 2023-09-12 PROCEDURE — 1160F RVW MEDS BY RX/DR IN RCRD: CPT | Mod: CPTII,95,, | Performed by: INTERNAL MEDICINE

## 2023-09-12 PROCEDURE — 1159F MED LIST DOCD IN RCRD: CPT | Mod: CPTII,95,, | Performed by: INTERNAL MEDICINE

## 2023-09-12 PROCEDURE — 99213 OFFICE O/P EST LOW 20 MIN: CPT | Mod: 95,,, | Performed by: INTERNAL MEDICINE

## 2023-09-12 PROCEDURE — 1160F PR REVIEW ALL MEDS BY PRESCRIBER/CLIN PHARMACIST DOCUMENTED: ICD-10-PCS | Mod: CPTII,95,, | Performed by: INTERNAL MEDICINE

## 2023-09-12 PROCEDURE — 99213 PR OFFICE/OUTPT VISIT, EST, LEVL III, 20-29 MIN: ICD-10-PCS | Mod: 95,,, | Performed by: INTERNAL MEDICINE

## 2023-09-12 PROCEDURE — 3044F HG A1C LEVEL LT 7.0%: CPT | Mod: CPTII,95,, | Performed by: INTERNAL MEDICINE

## 2023-09-12 PROCEDURE — 3044F PR MOST RECENT HEMOGLOBIN A1C LEVEL <7.0%: ICD-10-PCS | Mod: CPTII,95,, | Performed by: INTERNAL MEDICINE

## 2023-09-12 PROCEDURE — 1159F PR MEDICATION LIST DOCUMENTED IN MEDICAL RECORD: ICD-10-PCS | Mod: CPTII,95,, | Performed by: INTERNAL MEDICINE

## 2023-09-12 NOTE — PROGRESS NOTES
Subjective:       Patient ID: Elliott Hernandez is a 42 y.o. male.    Chief Complaint:   Elliott Hernandez is a 42 y.o. male presenting via video visit for evaluation/follow up of the following issues.     The patient location is: [place]  The chief complaint leading to consultation is: right abdominal swelling  Visit type: Virtual visit with synchronous audio and video  Total time spent with patient: 18 minutes  Each patient to whom he or she provides medical services by telemedicine is:  (1) informed of the relationship between the physician and patient and the respective role of any other health care provider with respect to management of the patient; and (2) notified that he or she may decline to receive medical services by telemedicine and may withdraw from such care at any time.    Hepatomegaly, elevated liver enzymes  Saw hepatology 9/8/23: non-alcholic steatohepatitis without cirrhosis    Prostatomegaly, ED  Urology rx tadalafil    Saw GI 8/21.  Recommended miralax, fiber.   Constipation is now resolved.  He has lost weight due to healthier diet.   Colonoscopy is scheduled.       Symptoms are still present. Not painful.   Right lower quadrant swelling.  Not able to feel as well when laying down. More noticeable when standing.     Review of Systems   Constitutional:  Negative for fever.   Gastrointestinal:  Positive for abdominal pain. Negative for constipation, diarrhea, nausea and vomiting.   Genitourinary:  Negative for dysuria, frequency and hematuria.   Musculoskeletal:  Negative for arthralgias and myalgias.   Neurological:  Negative for headaches.       Objective:   Physical Exam  Constitutional:       General: The patient is not in acute distress.     Appearance:  Patient is well-developed, not diaphoretic.   HENT:      Head: Atraumatic.   Pulmonary:      Effort: Pulmonary effort is normal. No respiratory distress.   Neurological:      Mental Status: Patient is alert and oriented to  person, place, and time.   Psychiatric:         Behavior: Behavior normal.     Assessment:       1. Abdominal right lower quadrant swelling        Plan:       1. Abdominal right lower quadrant swelling  Etiology unclear  CT and MRI both unrevealing  Colonoscopy scheduled  monitor           Answers submitted by the patient for this visit:  Abdominal Pain Questionnaire (Submitted on 9/11/2023)  Chief Complaint: Abdominal pain  Chronicity: recurrent  Onset: 1 to 4 weeks ago  Onset quality: undetermined  Frequency: constantly  Episode duration: 4 Weeks  Progression since onset: waxing and waning  Pain location: RLQ  Pain - numeric: 2/10  Pain quality: dull  Radiates to: right flank, pelvis  anorexia: No  belching: No  flatus: No  hematochezia: No  melena: No  weight loss: No  Aggravated by: coughing  Relieved by: certain positions  Diagnostic workup: CT scan  Pain severity: mild  Treatments tried: nothing  Improvement on treatment: no relief  abdominal surgery: No  colon cancer: No  Crohn's disease: No  gallstones: No  GERD: No  irritable bowel syndrome: No  kidney stones: No  pancreatitis: No  PUD: No  ulcerative colitis: No  UTI: No

## 2023-09-29 ENCOUNTER — PATIENT MESSAGE (OUTPATIENT)
Dept: ENDOSCOPY | Facility: HOSPITAL | Age: 43
End: 2023-09-29
Payer: COMMERCIAL

## 2023-09-29 ENCOUNTER — ANESTHESIA EVENT (OUTPATIENT)
Dept: ENDOSCOPY | Facility: HOSPITAL | Age: 43
End: 2023-09-29
Payer: COMMERCIAL

## 2023-09-29 NOTE — ANESTHESIA PREPROCEDURE EVALUATION
09/29/2023  Elliott Hernandez is a 43 y.o., male.  [facility]  Anesthesia Pre-Operative Evaluation       Patient Name: Elliott Hernandez  YOB: 1980  MRN: [id number]  CSN: [id number]      Code Status: Prior   Date of Procedure: 10/4/2023  Anesthesia: Choice Procedure: Procedure(s) (LRB):  COLONOSCOPY (N/A)  Pre-Operative Diagnosis: Abdominal pain, unspecified abdominal location [R10.9]  Proceduralist: Surgeon(s) and Role:     * Emmanuel Ignacio MD - Primary Nurse: (Unknown)      SUBJECTIVE:   Elliott Hernandez is a 43 y.o. male who  has a past medical history of Sleep apnea..     he has a current medication list which includes the following long-term medication(s): alprazolam and tadalafil.     ALLERGIES:   Review of patient's allergies indicates:  No Known Allergies  LDA:          Lines/Drains/Airways     None                Anesthesia Evaluation      Airway   Dental      Pulmonary    (+) sleep apnea,   Cardiovascular     Neuro/Psych      GI/Hepatic/Renal    Hepatitis: HARDIN.    Endo/Other    Abdominal                   MEDICATIONS:     Antibiotics (From admission, onward)    None        VTE Risk Mitigation (From admission, onward)    None            No current facility-administered medications for this encounter.     Current Outpatient Medications   Medication Sig Dispense Refill    allopurinoL (ZYLOPRIM) 100 MG tablet Take 1.5 tablets (150 mg total) by mouth once daily. 60 tablet 4    allopurinoL (ZYLOPRIM) 300 MG tablet Take 1 tablet (300 mg total) by mouth once daily. 30 tablet 6    ALPRAZolam (XANAX) 0.5 MG tablet Take 1 tablet (0.5 mg total) by mouth daily as needed (MRI). 2 tablet 0    fluticasone propionate (FLONASE) 50 mcg/actuation nasal spray fluticasone propionate 50 mcg/actuation nasal spray,suspension   SHAKE LQ AND U 2 SPRAYS IEN QD       "tadalafiL (CIALIS) 20 MG Tab Take 1 tablet (20 mg total) by mouth every other day. Take every other day as needed 10 tablet 11          History:   There are no hospital problems to display for this patient.    Surgical History:    has a past surgical history that includes LASIK (Bilateral); [place] tooth extraction; and Incision and drainage of hand (Left, 7/21/2023).   Social History:    has no history on file for sexual activity.  reports that he has quit smoking. His smoking use included cigarettes. He does not have any smokeless tobacco history on file. He reports current alcohol use of about 6.0 standard drinks of alcohol per week. He reports that he does not use drugs.     OBJECTIVE:     Vital Signs (Most Recent):    Vital Signs Range (Last 24H):  BP: ()/()   Arterial Line BP: ()/()        There is no height or weight on file to calculate BMI.   Wt Readings from Last 4 Encounters:   09/08/23 108 kg (238 lb 1.6 oz)   09/05/23 108 kg (238 lb 1.6 oz)   08/29/23 108 kg (238 lb)   08/21/23 109.6 kg (241 lb 10 oz)       Significant Labs:  Lab Results   Component Value Date    WBC 6.09 08/21/2023    HGB 16.6 08/21/2023    HCT 50.0 08/21/2023     08/21/2023     08/15/2023    K 3.8 08/15/2023     08/15/2023    CREATININE 1.0 08/15/2023    BUN 11 08/15/2023    CO2 25 08/15/2023    GLU 99 08/15/2023    CALCIUM 9.3 08/15/2023    ALKPHOS 52 (L) 09/11/2023    ALT 42 09/11/2023    AST 27 09/11/2023    ALBUMIN 4.4 09/11/2023    INR 1.0 09/11/2023    HGBA1C 5.3 03/06/2023     No LMP for male patient.  No results found for this or any previous visit (from the past 72 hour(s)).    EKG:   No results found for this or any previous visit.    TTE:  No results found for this or any previous visit.  No results found for: "EF"   No results found for this or any previous visit.  EDY:  No results found for this or any previous visit.  Stress Test:  No results found for this or any previous visit.     LHC:  No results " "found for this or any previous visit.     PFT:  No results found for: "FEV1", "FVC", "OIS6CUA", "TLC", "DLCO"     ASSESSMENT/PLAN:       Pre-op Assessment    I have reviewed the Patient Summary Reports.     I have reviewed the Nursing Notes. I have reviewed the NPO Status.   I have reviewed the Medications.     Review of Systems  Anesthesia Hx:  No problems with previous Anesthesia  Denies Family Hx of Anesthesia complications.   Denies Personal Hx of Anesthesia complications.   Hematology/Oncology:  Hematology Normal   Oncology Normal     EENT/Dental:EENT/Dental Normal   Cardiovascular:  Cardiovascular Normal     Pulmonary:   Sleep Apnea    Renal/:  Renal/ Normal     Hepatic/GI:  Hepatic/GI Normal Hepatitis: HARDIN.    Musculoskeletal:  Musculoskeletal Normal    Neurological:  Neurology Normal    Endocrine:  Endocrine Normal    Dermatological:  Skin Normal    Psych:  Psychiatric Normal              Anesthesia Plan  Type of Anesthesia, risks & benefits discussed:    Anesthesia Type: Gen Natural Airway  Intra-op Monitoring Plan: Standard ASA Monitors  Post Op Pain Control Plan: multimodal analgesia  Induction:  IV  Informed Consent: Informed consent signed with the Patient and all parties understand the risks and agree with anesthesia plan.  All questions answered. Patient consented to blood products? No  ASA Score: 3  Day of Surgery Review of History & Physical: H&P Update referred to the surgeon/provider.    Ready For Surgery From Anesthesia Perspective.     .      "

## 2023-10-02 ENCOUNTER — PATIENT MESSAGE (OUTPATIENT)
Dept: ENDOSCOPY | Facility: HOSPITAL | Age: 43
End: 2023-10-02
Payer: COMMERCIAL

## 2023-10-04 ENCOUNTER — HOSPITAL ENCOUNTER (OUTPATIENT)
Facility: HOSPITAL | Age: 43
Discharge: HOME | End: 2023-10-04
Attending: INTERNAL MEDICINE | Admitting: INTERNAL MEDICINE
Payer: COMMERCIAL

## 2023-10-04 ENCOUNTER — ANESTHESIA (OUTPATIENT)
Dept: ENDOSCOPY | Facility: HOSPITAL | Age: 43
End: 2023-10-04
Payer: COMMERCIAL

## 2023-10-04 VITALS
TEMPERATURE: 98 F | WEIGHT: 230 LBS | OXYGEN SATURATION: 97 % | HEIGHT: 75 IN | SYSTOLIC BLOOD PRESSURE: 131 MMHG | BODY MASS INDEX: 28.6 KG/M2 | DIASTOLIC BLOOD PRESSURE: 62 MMHG | HEART RATE: 76 BPM | RESPIRATION RATE: 18 BRPM

## 2023-10-04 DIAGNOSIS — R10.30 LOWER ABDOMINAL PAIN: Primary | ICD-10-CM

## 2023-10-04 DIAGNOSIS — R10.9 ABDOMINAL PAIN: ICD-10-CM

## 2023-10-04 PROCEDURE — 45378 PR COLONOSCOPY,DIAGNOSTIC: ICD-10-PCS | Mod: ,,, | Performed by: INTERNAL MEDICINE

## 2023-10-04 PROCEDURE — 94761 N-INVAS EAR/PLS OXIMETRY MLT: CPT

## 2023-10-04 PROCEDURE — 99900035 HC TECH TIME PER 15 MIN (STAT)

## 2023-10-04 PROCEDURE — 45378 DIAGNOSTIC COLONOSCOPY: CPT | Performed by: INTERNAL MEDICINE

## 2023-10-04 PROCEDURE — 25000003 PHARM REV CODE 250: Performed by: NURSE ANESTHETIST, CERTIFIED REGISTERED

## 2023-10-04 PROCEDURE — D9220A PRA ANESTHESIA: Mod: ,,, | Performed by: NURSE ANESTHETIST, CERTIFIED REGISTERED

## 2023-10-04 PROCEDURE — D9220A PRA ANESTHESIA: ICD-10-PCS | Mod: ,,, | Performed by: NURSE ANESTHETIST, CERTIFIED REGISTERED

## 2023-10-04 PROCEDURE — 63600175 PHARM REV CODE 636 W HCPCS: Performed by: NURSE ANESTHETIST, CERTIFIED REGISTERED

## 2023-10-04 PROCEDURE — 37000008 HC ANESTHESIA 1ST 15 MINUTES: Performed by: INTERNAL MEDICINE

## 2023-10-04 PROCEDURE — 37000009 HC ANESTHESIA EA ADD 15 MINS: Performed by: INTERNAL MEDICINE

## 2023-10-04 PROCEDURE — 45378 DIAGNOSTIC COLONOSCOPY: CPT | Mod: ,,, | Performed by: INTERNAL MEDICINE

## 2023-10-04 RX ORDER — PROPOFOL 10 MG/ML
VIAL (ML) INTRAVENOUS CONTINUOUS PRN
Status: DISCONTINUED | OUTPATIENT
Start: 2023-10-04 | End: 2023-10-04

## 2023-10-04 RX ORDER — LIDOCAINE HYDROCHLORIDE 20 MG/ML
INJECTION INTRAVENOUS
Status: DISCONTINUED | OUTPATIENT
Start: 2023-10-04 | End: 2023-10-04

## 2023-10-04 RX ORDER — PROPOFOL 10 MG/ML
VIAL (ML) INTRAVENOUS
Status: DISCONTINUED | OUTPATIENT
Start: 2023-10-04 | End: 2023-10-04

## 2023-10-04 RX ORDER — SODIUM CHLORIDE 9 MG/ML
INJECTION, SOLUTION INTRAVENOUS CONTINUOUS
Status: DISCONTINUED | OUTPATIENT
Start: 2023-10-04 | End: 2023-10-04 | Stop reason: HOSPADM

## 2023-10-04 RX ADMIN — PROPOFOL 100 MG: 10 INJECTION, EMULSION INTRAVENOUS at 11:10

## 2023-10-04 RX ADMIN — PROPOFOL 50 MG: 10 INJECTION, EMULSION INTRAVENOUS at 11:10

## 2023-10-04 RX ADMIN — GLYCOPYRROLATE 0.2 MG: 0.2 INJECTION, SOLUTION INTRAMUSCULAR; INTRAVENOUS at 11:10

## 2023-10-04 RX ADMIN — Medication 200 MCG/KG/MIN: at 11:10

## 2023-10-04 RX ADMIN — LIDOCAINE HYDROCHLORIDE 100 MG: 20 INJECTION INTRAVENOUS at 11:10

## 2023-10-04 RX ADMIN — SODIUM CHLORIDE: 0.9 INJECTION, SOLUTION INTRAVENOUS at 10:10

## 2023-10-04 NOTE — PLAN OF CARE
Pt in preop bay 5, VSS,IV inserted. Pt denies any open wounds on body or the use of any weight loss injections. Pt needs consents, otherwise ready to roll.

## 2023-10-04 NOTE — H&P
Short Stay Endoscopy History and Physical    PCP - Susan Rajan MD    Procedure - Colonoscopy  Sedation: GA  ASA - per anesthesia  Mallampati - per anesthesia  History of Anesthesia problems - no  Family history Anesthesia problems -  no     HPI:  This is a 43 y.o. male here for evaluation of : Abdominal pain and constipation    Reflux - no  Dysphagia - no  Abdominal pain - yes  Diarrhea - no    ROS:  Constitutional: No fevers, chills, No weight loss  ENT: No allergies  CV: No chest pain  Pulm: No cough, No shortness of breath  Ophtho: No vision changes  GI: see HPI  Medical History:  has a past medical history of Sleep apnea.    Surgical History:  has a past surgical history that includes LASIK (Bilateral); [place] tooth extraction; and Incision and drainage of hand (Left, 7/21/2023).    Family History: family history includes Cancer in his father; Heart disease in his maternal grandfather.. Otherwise no colon cancer, inflammatory bowel disease, or GI malignancies.    Social History:  reports that he has quit smoking. His smoking use included cigarettes. He does not have any smokeless tobacco history on file. He reports current alcohol use of about 6.0 standard drinks of alcohol per week. He reports that he does not use drugs.    Review of patient's allergies indicates:  No Known Allergies    Medications:   Medications Prior to Admission   Medication Sig Dispense Refill Last Dose    allopurinoL (ZYLOPRIM) 100 MG tablet Take 1.5 tablets (150 mg total) by mouth once daily. 60 tablet 4     allopurinoL (ZYLOPRIM) 300 MG tablet Take 1 tablet (300 mg total) by mouth once daily. 30 tablet 6     ALPRAZolam (XANAX) 0.5 MG tablet Take 1 tablet (0.5 mg total) by mouth daily as needed (MRI). 2 tablet 0     fluticasone propionate (FLONASE) 50 mcg/actuation nasal spray fluticasone propionate 50 mcg/actuation nasal spray,suspension   SHAKE LQ AND U 2 SPRAYS IEN QD       tadalafiL (CIALIS) 20 MG Tab Take 1 tablet (20 mg  total) by mouth every other day. Take every other day as needed 10 tablet 11        Objective Findings:    Vital Signs: Per nursing notes.    Physical Exam:  General Appearance: Well appearing in no acute distress  Head:   Normocephalic, without obvious abnormality  Eyes:    No scleral icterus  Airway: Open  Neck: No restriction in mobility  Lungs: CTA bilaterally in anterior and posterior fields, no wheezes, no crackles.  Heart:  Regular rate and rhythm, S1, S2 normal, no murmurs heard  Abdomen: Soft, non tender, non distended      Labs:  Lab Results   Component Value Date    WBC 6.09 08/21/2023    HGB 16.6 08/21/2023    HCT 50.0 08/21/2023     08/21/2023    ALT 42 09/11/2023    AST 27 09/11/2023     08/15/2023    K 3.8 08/15/2023     08/15/2023    CREATININE 1.0 08/15/2023    BUN 11 08/15/2023    CO2 25 08/15/2023    PSA 0.87 08/17/2023    INR 1.0 09/11/2023    HGBA1C 5.3 03/06/2023         I have explained the risks and benefits of endoscopy procedures to the patient including but not limited to bleeding, perforation, infection, and death.    Thank you so much for allowing me to participate in the care of Elliott Ignacio MD

## 2023-10-04 NOTE — ANESTHESIA POSTPROCEDURE EVALUATION
Anesthesia Post Evaluation    Patient: Elliott Hernandez    Procedure(s) Performed: Procedure(s) (LRB):  COLONOSCOPY (N/A)    Final Anesthesia Type: general      Patient location during evaluation: PACU  Patient participation: Yes- Able to Participate  Level of consciousness: awake and alert  Post-procedure vital signs: reviewed and stable  Pain management: adequate  Airway patency: patent    PONV status at discharge: No PONV  Anesthetic complications: no      Cardiovascular status: stable  Respiratory status: room air  Hydration status: euvolemic  Follow-up not needed.          Vitals Value Taken Time   /62 10/04/23 1200   Temp 36.6 °C (97.9 °F) 10/04/23 1135   Pulse 76 10/04/23 1200   Resp 18 10/04/23 1200   SpO2 97 % 10/04/23 1200         Event Time   Out of Recovery 11:50:00         Pain/Reji Score: Reji Score: 10 (10/4/2023 11:50 AM)

## 2023-10-04 NOTE — PROVATION PATIENT INSTRUCTIONS
Discharge Summary/Instructions after an Endoscopic Procedure  Patient Name: Elliott Hernandez  Patient MRN: [id number]  Patient YOB: 1980 Wednesday, October 4, 2023  Emmanuel Ignacio MD  Dear patient,  As a result of recent federal legislation (The Federal Cures Act), you may   receive lab or pathology results from your procedure in your [facility]   account before your physician is able to contact you. Your physician or   their representative will relay the results to you with their   recommendations at their soonest availability.  Thank you,  RESTRICTIONS:  During your procedure today, you received medications for sedation.  These   medications may affect your judgment, balance and coordination.  Therefore,   for 24 hours, you have the following restrictions:   - DO NOT drive a car, operate machinery, make legal/financial decisions,   sign important papers or drink alcohol.    ACTIVITY:  Today: no heavy lifting, straining or running due to procedural   sedation/anesthesia.  The following day: return to full activity including work.  DIET:  Eat and drink normally unless instructed otherwise.     TREATMENT FOR COMMON SIDE EFFECTS:  - Mild abdominal pain, nausea, belching, bloating or excessive gas:  rest,   eat lightly and use a heating pad.  - Sore Throat: treat with throat lozenges and/or gargle with warm salt   water.  - Because air was used during the procedure, expelling large amounts of air   from your rectum or belching is normal.  - If a bowel prep was taken, you may not have a bowel movement for 1-3 days.    This is normal.  SYMPTOMS TO WATCH FOR AND REPORT TO YOUR PHYSICIAN:  1. Abdominal pain or bloating, other than gas cramps.  2. Chest pain.  3. Back pain.  4. Signs of infection such as: chills or fever occurring within 24 hours   after the procedure.  5. Rectal bleeding, which would show as bright red, maroon, or black stools.   (A tablespoon of blood from the rectum is not serious,  especially if   hemorrhoids are present.)  6. Vomiting.  7. Weakness or dizziness.  GO DIRECTLY TO THE NEAREST EMERGENCY ROOM IF YOU HAVE ANY OF THE FOLLOWING:      Difficulty breathing              Chills and/or fever over 101 F   Persistent vomiting and/or vomiting blood   Severe abdominal pain   Severe chest pain   Black, tarry stools   Bleeding- more than one tablespoon   Any other symptom or condition that you feel may need urgent attention  Your doctor recommends these additional instructions:  If any biopsies were taken, your doctors clinic will contact you in 1 to 2   weeks with any results.  - Patient has a contact number available for emergencies.  The signs and   symptoms of potential delayed complications were discussed with the   patient.  Return to normal activities tomorrow.  Written discharge   instructions were provided to the patient.   - Discharge patient to home.   - Resume previous diet.   - Continue present medications.   - Repeat colonoscopy in 5 years for screening purposes.   For questions, problems or results please call your physician - Emmanuel Ignacio MD at Work:  [phone].  [facility], EMERGENCY ROOM PHONE NUMBER: [phone]  IF A COMPLICATION OR EMERGENCY SITUATION ARISES AND YOU ARE UNABLE TO REACH   YOUR PHYSICIAN - GO DIRECTLY TO THE EMERGENCY ROOM.  Emmanuel Ignacio MD  10/4/2023 11:34:06 AM  This report has been verified and signed electronically.  Dear patient,  As a result of recent federal legislation (The Federal Cures Act), you may   receive lab or pathology results from your procedure in your [facility]   account before your physician is able to contact you. Your physician or   their representative will relay the results to you with their   recommendations at their soonest availability.  Thank you,  PROVATION

## 2023-10-04 NOTE — TRANSFER OF CARE
"Anesthesia Transfer of Care Note    Patient: Elliott Hernandez    Procedure(s) Performed: Procedure(s) (LRB):  COLONOSCOPY (N/A)    Patient location: PACU    Anesthesia Type: general    Transport from OR: Transported from OR on room air with adequate spontaneous ventilation    Post pain: adequate analgesia    Post assessment: no apparent anesthetic complications and tolerated procedure well    Post vital signs: stable    Level of consciousness: responds to stimulation and sedated    Nausea/Vomiting: no nausea/vomiting    Complications: none    Transfer of care protocol was followed      Last vitals:   Visit Vitals  /74 (BP Location: Left arm, Patient Position: Lying)   Pulse 80   Temp 36.2 °C (97.1 °F) (Temporal)   Resp 18   Ht 6' 3" (1.905 m)   Wt 104.3 kg (230 lb)   SpO2 99%   BMI 28.75 kg/m²     "

## 2023-12-26 ENCOUNTER — PATIENT OUTREACH (OUTPATIENT)
Dept: ADMINISTRATIVE | Facility: HOSPITAL | Age: 43
End: 2023-12-26
Payer: COMMERCIAL

## 2023-12-26 NOTE — PROGRESS NOTES

## 2024-01-08 ENCOUNTER — OFFICE VISIT (OUTPATIENT)
Dept: RHEUMATOLOGY | Facility: CLINIC | Age: 44
End: 2024-01-08
Payer: COMMERCIAL

## 2024-01-08 ENCOUNTER — LAB VISIT (OUTPATIENT)
Dept: LAB | Facility: HOSPITAL | Age: 44
End: 2024-01-08
Attending: INTERNAL MEDICINE
Payer: COMMERCIAL

## 2024-01-08 VITALS
WEIGHT: 237.88 LBS | BODY MASS INDEX: 29.58 KG/M2 | SYSTOLIC BLOOD PRESSURE: 107 MMHG | HEIGHT: 75 IN | HEART RATE: 71 BPM | DIASTOLIC BLOOD PRESSURE: 61 MMHG

## 2024-01-08 DIAGNOSIS — M10.9 GOUTY ARTHRITIS: Primary | ICD-10-CM

## 2024-01-08 DIAGNOSIS — M10.9 GOUTY ARTHRITIS: ICD-10-CM

## 2024-01-08 LAB
ALBUMIN SERPL BCP-MCNC: 4.2 G/DL (ref 3.5–5.2)
ALP SERPL-CCNC: 59 U/L (ref 55–135)
ALT SERPL W/O P-5'-P-CCNC: 59 U/L (ref 10–44)
ANION GAP SERPL CALC-SCNC: 7 MMOL/L (ref 8–16)
AST SERPL-CCNC: 37 U/L (ref 10–40)
BILIRUB SERPL-MCNC: 1 MG/DL (ref 0.1–1)
BUN SERPL-MCNC: 19 MG/DL (ref 6–20)
CALCIUM SERPL-MCNC: 9.2 MG/DL (ref 8.7–10.5)
CHLORIDE SERPL-SCNC: 105 MMOL/L (ref 95–110)
CO2 SERPL-SCNC: 28 MMOL/L (ref 23–29)
CREAT SERPL-MCNC: 1 MG/DL (ref 0.5–1.4)
EST. GFR  (NO RACE VARIABLE): >60 ML/MIN/1.73 M^2
GLUCOSE SERPL-MCNC: 77 MG/DL (ref 70–110)
POTASSIUM SERPL-SCNC: 4.4 MMOL/L (ref 3.5–5.1)
PROT SERPL-MCNC: 7.2 G/DL (ref 6–8.4)
SODIUM SERPL-SCNC: 140 MMOL/L (ref 136–145)
URATE SERPL-MCNC: 6.6 MG/DL (ref 3.4–7)

## 2024-01-08 PROCEDURE — 3074F SYST BP LT 130 MM HG: CPT | Mod: CPTII,S$GLB,, | Performed by: INTERNAL MEDICINE

## 2024-01-08 PROCEDURE — 3008F BODY MASS INDEX DOCD: CPT | Mod: CPTII,S$GLB,, | Performed by: INTERNAL MEDICINE

## 2024-01-08 PROCEDURE — 3078F DIAST BP <80 MM HG: CPT | Mod: CPTII,S$GLB,, | Performed by: INTERNAL MEDICINE

## 2024-01-08 PROCEDURE — 99214 OFFICE O/P EST MOD 30 MIN: CPT | Mod: S$GLB,,, | Performed by: INTERNAL MEDICINE

## 2024-01-08 PROCEDURE — 36415 COLL VENOUS BLD VENIPUNCTURE: CPT | Performed by: INTERNAL MEDICINE

## 2024-01-08 PROCEDURE — 1159F MED LIST DOCD IN RCRD: CPT | Mod: CPTII,S$GLB,, | Performed by: INTERNAL MEDICINE

## 2024-01-08 PROCEDURE — 99999 PR PBB SHADOW E&M-EST. PATIENT-LVL III: CPT | Mod: PBBFAC,,, | Performed by: INTERNAL MEDICINE

## 2024-01-08 PROCEDURE — 84550 ASSAY OF BLOOD/URIC ACID: CPT | Performed by: INTERNAL MEDICINE

## 2024-01-08 PROCEDURE — 80053 COMPREHEN METABOLIC PANEL: CPT | Performed by: INTERNAL MEDICINE

## 2024-01-08 NOTE — PROGRESS NOTES
Subjective:       Patient ID: Elliott Hernandez is a 42 y.o. male.    Chief Complaint: Disease Management   HPI: 42 year old M  with presumed gout here to establish care. In 2018, he had episode of pain and swelling in left ankle.  He had steroid injection and it helped.  In 2019 and 2020,  it affected his left ankle. Most recently, he had pain and swelling in left knee. He had trouble walking or bending his knee.  He took anti inflammatory.    He reports medrol normally works him better.  Denies any rashes, oral ulcers,photosensitivity, or raynauds.   He drinks on occasion during vacations.  Family hx:  negative for psoriasis; gout: diet       Interval history: He stopped taking allopurinol and colchicine daily in October because he changed his diet.  Denies any flares.    Review of Systems   Constitutional:  Negative for activity change, appetite change, chills, diaphoresis, fatigue, fever and unexpected weight change.   HENT:  Negative for ear discharge, ear pain, hearing loss, mouth sores, nosebleeds, sinus pressure and trouble swallowing.    Eyes: Negative.  Negative for photophobia, pain, discharge, redness and itching.   Respiratory:  Negative for cough, chest tightness and shortness of breath.    Cardiovascular:  Negative for chest pain, palpitations and leg swelling.   Gastrointestinal:  Negative for abdominal distention, blood in stool, constipation, diarrhea and nausea.   Endocrine: Negative for cold intolerance, heat intolerance, polydipsia and polyphagia.   Genitourinary:  Negative for flank pain, genital sores and hematuria.   Musculoskeletal:  Positive for arthralgias and joint swelling. Negative for back pain, gait problem and neck pain.   Skin:  Negative for color change, pallor and rash.   Neurological:  Negative for dizziness, weakness, light-headedness and headaches.   Hematological:  Negative for adenopathy. Does not bruise/bleed easily.   Psychiatric/Behavioral:  Negative for decreased  "concentration and hallucinations. The patient is not nervous/anxious.          Objective:   /84   Pulse 74   Ht 6' 3" (1.905 m)   Wt 112.6 kg (248 lb 3.8 oz)   BMI 31.03 kg/m²      Physical Exam   Constitutional: normal appearance.   HENT:   Head: Normocephalic and atraumatic.   Nose: Nose normal. No rhinorrhea or nasal congestion.   Mouth/Throat: Mucous membranes are dry. Oropharynx is clear.   Eyes: Pupils are equal, round, and reactive to light. Conjunctivae are normal. Right eye exhibits no discharge. Left eye exhibits no discharge. No scleral icterus.   Cardiovascular: Normal rate and regular rhythm. Exam reveals no gallop and no friction rub.   No murmur heard.  Pulmonary/Chest: No stridor. No respiratory distress. He has no wheezes.   Abdominal: Bowel sounds are normal. He exhibits no distension and no mass. There is no abdominal tenderness.   Musculoskeletal:         General: Tenderness present. Normal range of motion.   Neurological: He is alert.   Skin: No bruising noted. No jaundice or pallor.   Mild tenderness in left knee with extension      No data to display     Assessment:     43 year old M  with presumed gout here for follow up of gout.. He has  had episodes of left ankle swelling and left knee swelling when he flares.  He reports flares are usually in setting if eating meat and also alcohol.  He stopped his allopurinol and colchicine since he changed his diet and denies any flares.  Plan:       Problem List Items Addressed This Visit    None    Off allopurinol 200mg po qday and  colchicine 0.6 mg po qd ay  labs        30 * minutes of total time spent on the encounter, which includes face to face time and non-face to face time preparing to see the patient (eg, review of tests), Obtaining and/or reviewing separately obtained history, Documenting clinical information in the electronic or other health record, Independently interpreting results (not separately reported) and communicating " results to the patient/family/caregiver, or Care coordination (not separately reported).   Rtc in a year

## 2024-01-08 NOTE — PROGRESS NOTES
Subjective:       Patient ID: Elliott Hernandez is a 42 y.o. male.    Chief Complaint: Disease Management   HPI: 42 year old M  with presumed gout here to establish care. In 2018, he had episode of pain and swelling in left ankle.  He had steroid injection and it helped.  In 2019 and 2020,  it affected his left ankle. Most recently, he had pain and swelling in left knee. He had trouble walking or bending his knee.  He took anti inflammatory.    He reports medrol normally works him better.  Denies any rashes, oral ulcers,photosensitivity, or raynauds.   He drinks on occasion during vacations.  Family hx:  negative for psoriasis; gout: diet       Interval history:  he stopped allopurinol and colchicine around Halloween He is doing clean eating. He was diagnosed with CHUCKY recently. Denies any flares.  Denies any gout flares.  Review of Systems    Constitutional:  Negative for activity change, appetite change, chills, diaphoresis, fatigue, fever and unexpected weight change.   HENT:  Negative for ear discharge, ear pain, hearing loss, mouth sores, nosebleeds, sinus pressure and trouble swallowing.    Eyes: Negative.  Negative for photophobia, pain, discharge, redness and itching.   Respiratory:  Negative for cough, chest tightness and shortness of breath.    Cardiovascular:  Negative for chest pain, palpitations and leg swelling.   Gastrointestinal:  Negative for abdominal distention, blood in stool, constipation, diarrhea and nausea.   Endocrine: Negative for cold intolerance, heat intolerance, polydipsia and polyphagia.   Genitourinary:  Negative for flank pain, genital sores and hematuria.   Musculoskeletal:  Positive for arthralgias and joint swelling. Negative for back pain, gait problem and neck pain.   Skin:  Negative for color change, pallor and rash.   Neurological:  Negative for dizziness, weakness, light-headedness and headaches.   Hematological:  Negative for adenopathy. Does not bruise/bleed easily.  "  Psychiatric/Behavioral:  Negative for decreased concentration and hallucinations. The patient is not nervous/anxious.          Objective:   /84   Pulse 74   Ht 6' 3" (1.905 m)   Wt 112.6 kg (248 lb 3.8 oz)   BMI 31.03 kg/m²      Physical Exam   Constitutional: normal appearance.   HENT:   Head: Normocephalic and atraumatic.   Nose: Nose normal. No rhinorrhea or nasal congestion.   Mouth/Throat: Mucous membranes are dry. Oropharynx is clear.   Eyes: Pupils are equal, round, and reactive to light. Conjunctivae are normal. Right eye exhibits no discharge. Left eye exhibits no discharge. No scleral icterus.   Cardiovascular: Normal rate and regular rhythm. Exam reveals no gallop and no friction rub.   No murmur heard.  Pulmonary/Chest: No stridor. No respiratory distress. He has no wheezes.   Abdominal: Bowel sounds are normal. He exhibits no distension and no mass. There is no abdominal tenderness.   Musculoskeletal:         General: Tenderness present. Normal range of motion.   Neurological: He is alert.   Skin: No bruising noted. No jaundice or pallor.   Mild tenderness in left knee with extension      No data to display     Assessment:     42 year old M  with presumed gout, CHUCKY, HARDIN  here for follow up of gout.. He has episodes of left ankle swelling and left knee swelling when he flares.  He reports flares are usually in setting if eating meat and also alcohol.        Plan:       Problem List Items Addressed This Visit    None    continue allopurinol 200mg po qday  Continue colchicine 0.6 mg po qd ay  labs        30 * minutes of total time spent on the encounter, which includes face to face time and non-face to face time preparing to see the patient (eg, review of tests), Obtaining and/or reviewing separately obtained history, Documenting clinical information in the electronic or other health record, Independently interpreting results (not separately reported) and communicating results to the " patient/family/caregiver, or Care coordination (not separately reported).

## 2024-01-08 NOTE — PROGRESS NOTES
1/7/2024     7:58 PM   Rapid3 Question Responses and Scores   MDHAQ Score 0   Psychologic Score 0   Pain Score 0   When you awakened in the morning OVER THE LAST WEEK, did you feel stiff? No   Fatigue Score 0   Global Health Score 0   RAPID3 Score 0     Answers submitted by the patient for this visit:  Rheumatology Questionnaire (Submitted on 1/7/2024)  fever: No  eye redness: No  mouth sores: No  headaches: No  shortness of breath: No  chest pain: No  trouble swallowing: No  diarrhea: No  constipation: No  unexpected weight change: No  genital sore: No  During the last 3 days, have you had a skin rash?: No  Bruises or bleeds easily: No  cough: No

## 2024-01-22 ENCOUNTER — IMMUNIZATION (OUTPATIENT)
Dept: INTERNAL MEDICINE | Facility: CLINIC | Age: 44
End: 2024-01-22
Payer: COMMERCIAL

## 2024-01-22 ENCOUNTER — OFFICE VISIT (OUTPATIENT)
Dept: INTERNAL MEDICINE | Facility: CLINIC | Age: 44
End: 2024-01-22
Payer: COMMERCIAL

## 2024-01-22 VITALS
WEIGHT: 234.44 LBS | HEART RATE: 66 BPM | SYSTOLIC BLOOD PRESSURE: 105 MMHG | OXYGEN SATURATION: 98 % | BODY MASS INDEX: 30.09 KG/M2 | DIASTOLIC BLOOD PRESSURE: 76 MMHG | HEIGHT: 74 IN

## 2024-01-22 DIAGNOSIS — G47.30 SLEEP APNEA, UNSPECIFIED TYPE: ICD-10-CM

## 2024-01-22 DIAGNOSIS — Z23 NEED FOR VACCINATION: Primary | ICD-10-CM

## 2024-01-22 DIAGNOSIS — K75.81 NASH (NONALCOHOLIC STEATOHEPATITIS): ICD-10-CM

## 2024-01-22 DIAGNOSIS — Z12.83 SKIN EXAM, SCREENING FOR CANCER: ICD-10-CM

## 2024-01-22 DIAGNOSIS — Z00.00 HEALTH CARE MAINTENANCE: Primary | ICD-10-CM

## 2024-01-22 PROCEDURE — 3078F DIAST BP <80 MM HG: CPT | Mod: CPTII,S$GLB,, | Performed by: INTERNAL MEDICINE

## 2024-01-22 PROCEDURE — 99396 PREV VISIT EST AGE 40-64: CPT | Mod: S$GLB,,, | Performed by: INTERNAL MEDICINE

## 2024-01-22 PROCEDURE — 99999 PR PBB SHADOW E&M-EST. PATIENT-LVL III: CPT | Mod: PBBFAC,,, | Performed by: INTERNAL MEDICINE

## 2024-01-22 PROCEDURE — 90686 IIV4 VACC NO PRSV 0.5 ML IM: CPT | Mod: S$GLB,,, | Performed by: INTERNAL MEDICINE

## 2024-01-22 PROCEDURE — 3074F SYST BP LT 130 MM HG: CPT | Mod: CPTII,S$GLB,, | Performed by: INTERNAL MEDICINE

## 2024-01-22 PROCEDURE — 90471 IMMUNIZATION ADMIN: CPT | Mod: S$GLB,,, | Performed by: INTERNAL MEDICINE

## 2024-01-22 PROCEDURE — 3008F BODY MASS INDEX DOCD: CPT | Mod: CPTII,S$GLB,, | Performed by: INTERNAL MEDICINE

## 2024-01-22 PROCEDURE — 1160F RVW MEDS BY RX/DR IN RCRD: CPT | Mod: CPTII,S$GLB,, | Performed by: INTERNAL MEDICINE

## 2024-01-22 PROCEDURE — 1159F MED LIST DOCD IN RCRD: CPT | Mod: CPTII,S$GLB,, | Performed by: INTERNAL MEDICINE

## 2024-01-22 NOTE — PROGRESS NOTES
"Subjective:       Patient ID: Elliott Hernandez is a 43 y.o. male.    Chief Complaint: Annual Exam    HPI  Elliott Hernandez is a 43 y.o. male here for a yearly preventative healthcare visit.     HARDIN without cirrhosis  Recent liver enzymes stable.  Ast 37  Alt 59   Right lower quadrant swelling of unclear etiology  CT and MRI unrevealing.  It has resolved on its own and has not resolved.     Sleep apnea  Has cpap  Followed by Dr. Coelho.    Has been working on diet and exercise.     Hx gout  He stopped allopurinol and has not had any flares.     Dad  of esophageal cancer at age 61. Former smoker. Lots of reflux.     Wt Readings from Last 4 Encounters:   24 106.4 kg (234 lb 7.4 oz)   24 107.9 kg (237 lb 14 oz)   10/04/23 104.3 kg (230 lb)   23 108 kg (238 lb 1.6 oz)       Review of Systems   Constitutional:  Negative for fever.   Respiratory:  Negative for shortness of breath.    Cardiovascular:  Negative for chest pain.   Musculoskeletal: Negative.    Skin: Negative.        Objective:   /76 (BP Location: Left arm, Patient Position: Sitting, BP Method: Large (Manual))   Pulse 66   Ht 6' 2" (1.88 m)   Wt 106.4 kg (234 lb 7.4 oz)   SpO2 98%   BMI 30.10 kg/m²      Physical Exam  Vitals reviewed.   Constitutional:       Appearance: He is well-developed.   HENT:      Head: Normocephalic and atraumatic.   Eyes:      Conjunctiva/sclera: Conjunctivae normal.      Pupils: Pupils are equal, round, and reactive to light.   Neck:      Thyroid: No thyromegaly.   Cardiovascular:      Rate and Rhythm: Normal rate and regular rhythm.      Heart sounds: Normal heart sounds. No murmur heard.  Pulmonary:      Effort: Pulmonary effort is normal. No respiratory distress.      Breath sounds: Normal breath sounds. No wheezing.   Abdominal:      General: Bowel sounds are normal. There is no distension.      Palpations: Abdomen is soft.      Tenderness: There is no abdominal tenderness. "   Musculoskeletal:         General: Normal range of motion.      Cervical back: Neck supple.   Skin:     General: Skin is warm and dry.      Findings: No rash.   Neurological:      General: No focal deficit present.      Mental Status: He is alert and oriented to person, place, and time.   Psychiatric:         Thought Content: Thought content normal.         Judgment: Judgment normal.         Assessment:       1. Health care maintenance    2. HARDIN (nonalcoholic steatohepatitis)    3. Sleep apnea, unspecified type    4. Skin exam, screening for cancer        Plan:       1. Health care maintenance  -     Lipid Panel; Future; Expected date: 01/22/2024  -     CBC Auto Differential; Future; Expected date: 01/22/2024    2. HARDIN (nonalcoholic steatohepatitis)    3. Sleep apnea, unspecified type    4. Skin exam, screening for cancer  -     Ambulatory referral/consult to Dermatology; Future; Expected date: 01/29/2024           Health Maintenance Due   Topic    Lipid Panel       Labs -[facility] fasting  Flu  Declines covid booster  Repeat colonoscopy in 2028; will plan to do EGD at this time as well due to family hx of esophageal cancer

## 2024-01-24 ENCOUNTER — LAB VISIT (OUTPATIENT)
Dept: LAB | Facility: HOSPITAL | Age: 44
End: 2024-01-24
Attending: INTERNAL MEDICINE
Payer: COMMERCIAL

## 2024-01-24 DIAGNOSIS — Z00.00 HEALTH CARE MAINTENANCE: ICD-10-CM

## 2024-01-24 LAB
BASOPHILS # BLD AUTO: 0.05 K/UL (ref 0–0.2)
BASOPHILS NFR BLD: 0.8 % (ref 0–1.9)
CHOLEST SERPL-MCNC: 211 MG/DL (ref 120–199)
CHOLEST/HDLC SERPL: 7 {RATIO} (ref 2–5)
DIFFERENTIAL METHOD BLD: NORMAL
EOSINOPHIL # BLD AUTO: 0.3 K/UL (ref 0–0.5)
EOSINOPHIL NFR BLD: 4.3 % (ref 0–8)
ERYTHROCYTE [DISTWIDTH] IN BLOOD BY AUTOMATED COUNT: 14 % (ref 11.5–14.5)
HCT VFR BLD AUTO: 49.1 % (ref 40–54)
HDLC SERPL-MCNC: 30 MG/DL (ref 40–75)
HDLC SERPL: 14.2 % (ref 20–50)
HGB BLD-MCNC: 15.8 G/DL (ref 14–18)
IMM GRANULOCYTES # BLD AUTO: 0.01 K/UL (ref 0–0.04)
IMM GRANULOCYTES NFR BLD AUTO: 0.2 % (ref 0–0.5)
LDLC SERPL CALC-MCNC: 151.6 MG/DL (ref 63–159)
LYMPHOCYTES # BLD AUTO: 1.7 K/UL (ref 1–4.8)
LYMPHOCYTES NFR BLD: 27.8 % (ref 18–48)
MCH RBC QN AUTO: 28.5 PG (ref 27–31)
MCHC RBC AUTO-ENTMCNC: 32.2 G/DL (ref 32–36)
MCV RBC AUTO: 89 FL (ref 82–98)
MONOCYTES # BLD AUTO: 0.6 K/UL (ref 0.3–1)
MONOCYTES NFR BLD: 9.6 % (ref 4–15)
NEUTROPHILS # BLD AUTO: 3.5 K/UL (ref 1.8–7.7)
NEUTROPHILS NFR BLD: 57.3 % (ref 38–73)
NONHDLC SERPL-MCNC: 181 MG/DL
NRBC BLD-RTO: 0 /100 WBC
PLATELET # BLD AUTO: 240 K/UL (ref 150–450)
PMV BLD AUTO: 9.5 FL (ref 9.2–12.9)
RBC # BLD AUTO: 5.54 M/UL (ref 4.6–6.2)
TRIGL SERPL-MCNC: 147 MG/DL (ref 30–150)
WBC # BLD AUTO: 6.04 K/UL (ref 3.9–12.7)

## 2024-01-24 PROCEDURE — 36415 COLL VENOUS BLD VENIPUNCTURE: CPT | Mod: PO | Performed by: INTERNAL MEDICINE

## 2024-01-24 PROCEDURE — 80061 LIPID PANEL: CPT | Performed by: INTERNAL MEDICINE

## 2024-01-24 PROCEDURE — 85025 COMPLETE CBC W/AUTO DIFF WBC: CPT | Performed by: INTERNAL MEDICINE

## 2024-06-10 ENCOUNTER — PATIENT MESSAGE (OUTPATIENT)
Dept: INTERNAL MEDICINE | Facility: CLINIC | Age: 44
End: 2024-06-10
Payer: COMMERCIAL

## 2024-06-10 ENCOUNTER — OFFICE VISIT (OUTPATIENT)
Dept: DERMATOLOGY | Facility: CLINIC | Age: 44
End: 2024-06-10
Payer: COMMERCIAL

## 2024-06-10 DIAGNOSIS — L91.8 SKIN TAG: ICD-10-CM

## 2024-06-10 DIAGNOSIS — D22.9 MULTIPLE BENIGN NEVI: ICD-10-CM

## 2024-06-10 DIAGNOSIS — L81.4 LENTIGO: ICD-10-CM

## 2024-06-10 DIAGNOSIS — Z12.83 SKIN EXAM, SCREENING FOR CANCER: ICD-10-CM

## 2024-06-10 DIAGNOSIS — D18.01 CHERRY ANGIOMA: ICD-10-CM

## 2024-06-10 DIAGNOSIS — L73.8 SEBACEOUS HYPERPLASIA: ICD-10-CM

## 2024-06-10 PROCEDURE — 1160F RVW MEDS BY RX/DR IN RCRD: CPT | Mod: CPTII,S$GLB,, | Performed by: STUDENT IN AN ORGANIZED HEALTH CARE EDUCATION/TRAINING PROGRAM

## 2024-06-10 PROCEDURE — 99203 OFFICE O/P NEW LOW 30 MIN: CPT | Mod: S$GLB,,, | Performed by: STUDENT IN AN ORGANIZED HEALTH CARE EDUCATION/TRAINING PROGRAM

## 2024-06-10 PROCEDURE — 99999 PR PBB SHADOW E&M-EST. PATIENT-LVL III: CPT | Mod: PBBFAC,,, | Performed by: STUDENT IN AN ORGANIZED HEALTH CARE EDUCATION/TRAINING PROGRAM

## 2024-06-10 PROCEDURE — 1159F MED LIST DOCD IN RCRD: CPT | Mod: CPTII,S$GLB,, | Performed by: STUDENT IN AN ORGANIZED HEALTH CARE EDUCATION/TRAINING PROGRAM

## 2024-06-10 NOTE — PROGRESS NOTES
Subjective:      Patient ID:  Elliott Hernandez is a 43 y.o. male who presents for   Chief Complaint   Patient presents with    Skin Check     Pt here for skin check     Pt denies h/o skin cancer     Pt denies any new changing, bleeding or growing lesions today.        Review of Systems   Hematologic/Lymphatic: Does not bruise/bleed easily.       Objective:   Physical Exam   Constitutional: He appears well-developed and well-nourished. No distress.   Neurological: He is alert and oriented to person, place, and time. He is not disoriented.   Psychiatric: He has a normal mood and affect.   Skin:   Areas Examined (abnormalities noted in diagram):   Scalp / Hair Palpated and Inspected  Head / Face Inspection Performed  Neck Inspection Performed  Chest / Axilla Inspection Performed  Abdomen Inspection Performed  Back Inspection Performed  RUE Inspected  LUE Inspection Performed  RLE Inspected  LLE Inspection Performed  Nails and Digits Inspection Performed                         Diagram Legend     Erythematous scaling macule/papule c/w actinic keratosis       Vascular papule c/w angioma      Pigmented verrucoid papule/plaque c/w seborrheic keratosis      Yellow umbilicated papule c/w sebaceous hyperplasia      Irregularly shaped tan macule c/w lentigo     1-2 mm smooth white papules consistent with Milia      Movable subcutaneous cyst with punctum c/w epidermal inclusion cyst      Subcutaneous movable cyst c/w pilar cyst      Firm pink to brown papule c/w dermatofibroma      Pedunculated fleshy papule(s) c/w skin tag(s)      Evenly pigmented macule c/w junctional nevus     Mildly variegated pigmented, slightly irregular-bordered macule c/w mildly atypical nevus      Flesh colored to evenly pigmented papule c/w intradermal nevus       Pink pearly papule/plaque c/w basal cell carcinoma      Erythematous hyperkeratotic cursted plaque c/w SCC      Surgical scar with no sign of skin cancer recurrence      Open and  closed comedones      Inflammatory papules and pustules      Verrucoid papule consistent consistent with wart     Erythematous eczematous patches and plaques     Dystrophic onycholytic nail with subungual debris c/w onychomycosis     Umbilicated papule    Erythematous-base heme-crusted tan verrucoid plaque consistent with inflamed seborrheic keratosis     Erythematous Silvery Scaling Plaque c/w Psoriasis     See annotation      Assessment / Plan:        Multiple benign nevi  Sebaceous hyperplasia  Skin tag  Lentigo  Cherry angioma  Reassurance given to patient. No treatment is necessary.   Treatment of benign, asymptomatic lesions may be considered cosmetic.    Skin exam, screening for cancer  Total body skin examination performed today including at least 12 points as noted in physical examination. No lesions suspicious for malignancy noted.    Recommend daily sun protection/avoidance, use of at least SPF 30, broad spectrum sunscreen (OTC drug), skin self examinations, and routine physician surveillance to optimize early detection         Follow up in about 1 year (around 6/10/2025) for TBSE.

## 2024-06-10 NOTE — PATIENT INSTRUCTIONS
Sunscreen Guidelines  Sunscreen protects your skin by absorbing and reflecting ultraviolet rays. All sunscreens have a sun protection factor (SPF) rating that indicates how long a sunscreen will remain effective on the skin.    Why protect your skin?  The sun's rays are composed of many different wavelengths, including UVA, UVB, and visible light that each affect the skin differently.    UVB: sunburn, photoaging, skin cancer (melanoma, basal cell, and squamous cell carcinomas) and modulation of the skin's immune system.    UVA: similar to above but thought to contribute more to aging; at the same dose of UVB it is less powerful however the sun has 10-20 times the levels of UVA as compared with UVB.  Visible light: implicated in causing unwanted darkening of skin, such as melasma and post-inflammatory hyperpigmentation in darker skin types     If I have dark skin, do I need to worry about the sun?    More darkly pigmented skin is more protected against UV-induced skin cancer, sunburn, and photoaging, though may still suffer from sun-related conditions, including melasma, hyperpigmentation, and other dark spots.    Sun avoidance  As a general rule, stay out of the sun as much as possible between 10 a.m. - 4 p.m.    Download the EPA UV index maged to track the UV index by hour in your zip code.      Which sunscreen should I choose?  The best sunscreen to use varies by individual. The one that feels best on your skin and fits your lifestyle will be the one you will likely use most regularly.   Active ingredients of sunscreen vary by , and may be a chemical (such as avobenzone or oxybenzone) or physical agent (such as zinc oxide or titanium dioxide). I recommend a physical agent.  A water-resistant sunscreen is one that maintains the SPF level after 40 minutes of water exposure. A very water-resistant sunscreen maintains the SPF level after 80 minutes of water exposure.    Sunscreen: this is the last layer in  "sun protection   Be generous: 1 shot glass of sunscreen for your body, ½ teaspoon for your face/neck  Reapply every 2 hours  Broad spectrum (provides UVA/UVB protection), SPF 50 or above  Avoid spray sunscreens: less effective and have been found to contain benzene (carcinogen)    Sun protective clothing  Although sunscreen helps minimize sun damage, no sunscreen completely blocks all wavelengths of UV light. Wearing sun protective clothing such as hats, rashguards or swim shirts, and long sleeves and/or pants, as well as avoiding sun exposure from 10 a.m. to 4 p.m. will help protect your skin from overexposure and minimize sun damage. Seek shade.  Long sleeved clothing, hats, and sunglasses: makes sun protection easier, more effective, and can even be more affordable, since sunscreen needs to be reapplied frequently.    Solumbra (www.sunprectautions.Viewsy)  Libretto (www.Euroffice)  Coolibar (www.VIOlife.Viewsy)  Land's end (www.Lawn Love)  Hats from Janny [employer] ([email or web address])    My Favorite Sunscreens:  Physical blockers: Can have a "white case" but in general are more effective  - Face: CeraVe tinted mineral sunscreen, Bare Minerals complexion rescue (20 shades), Elta MD (UV elements, UV physical, UV restore, etc), Tizo ultra zinc tinted, Cetaphil Sheer Mineral Face Liquid Sunscreen  - Body: Blue Lizard, Neutrogena Sheer Zinc, Eucerin Daily Protection, Aveeno Baby   "

## 2025-04-01 ENCOUNTER — OFFICE VISIT (OUTPATIENT)
Dept: INTERNAL MEDICINE | Facility: CLINIC | Age: 45
End: 2025-04-01
Payer: COMMERCIAL

## 2025-04-01 VITALS
OXYGEN SATURATION: 97 % | HEART RATE: 72 BPM | HEIGHT: 74 IN | DIASTOLIC BLOOD PRESSURE: 80 MMHG | WEIGHT: 232.5 LBS | BODY MASS INDEX: 29.84 KG/M2 | SYSTOLIC BLOOD PRESSURE: 122 MMHG

## 2025-04-01 DIAGNOSIS — G47.30 SLEEP APNEA, UNSPECIFIED TYPE: ICD-10-CM

## 2025-04-01 DIAGNOSIS — N52.9 ERECTILE DYSFUNCTION, UNSPECIFIED ERECTILE DYSFUNCTION TYPE: ICD-10-CM

## 2025-04-01 DIAGNOSIS — Z00.00 HEALTH CARE MAINTENANCE: Primary | ICD-10-CM

## 2025-04-01 DIAGNOSIS — M10.9 ACUTE GOUT OF MULTIPLE SITES, UNSPECIFIED CAUSE: ICD-10-CM

## 2025-04-01 DIAGNOSIS — K75.81 NASH (NONALCOHOLIC STEATOHEPATITIS): ICD-10-CM

## 2025-04-01 DIAGNOSIS — R74.8 ABNORMAL LIVER ENZYMES: ICD-10-CM

## 2025-04-01 PROCEDURE — 3008F BODY MASS INDEX DOCD: CPT | Mod: CPTII,S$GLB,, | Performed by: INTERNAL MEDICINE

## 2025-04-01 PROCEDURE — 1159F MED LIST DOCD IN RCRD: CPT | Mod: CPTII,S$GLB,, | Performed by: INTERNAL MEDICINE

## 2025-04-01 PROCEDURE — 3079F DIAST BP 80-89 MM HG: CPT | Mod: CPTII,S$GLB,, | Performed by: INTERNAL MEDICINE

## 2025-04-01 PROCEDURE — 1160F RVW MEDS BY RX/DR IN RCRD: CPT | Mod: CPTII,S$GLB,, | Performed by: INTERNAL MEDICINE

## 2025-04-01 PROCEDURE — 99396 PREV VISIT EST AGE 40-64: CPT | Mod: S$GLB,,, | Performed by: INTERNAL MEDICINE

## 2025-04-01 PROCEDURE — 3074F SYST BP LT 130 MM HG: CPT | Mod: CPTII,S$GLB,, | Performed by: INTERNAL MEDICINE

## 2025-04-01 PROCEDURE — 99999 PR PBB SHADOW E&M-EST. PATIENT-LVL III: CPT | Mod: PBBFAC,,, | Performed by: INTERNAL MEDICINE

## 2025-04-01 NOTE — PROGRESS NOTES
"Subjective:       Patient ID: Elliott Hernandez is a 44 y.o. male.    Chief Complaint: Annual Exam    HPI  Elliott Hernandez is a 44 y.o. male here for a yearly preventative healthcare visit.     HARDIN without cirrhosis  Last liver enzymes in :  Ast 37  Alt 59   Hepatomegaly and hepatic steatosis on imaging.   Right lower quadrant swelling of unclear etiology on CT and MRI in .   It has resolved on its own and has not resolved.      Trying to stay active and eat right.     Sleep apnea  Has cpap but using oral appliance now instead from dentist and it seems to be working well.      Hx gout  He stopped allopurinol and has not had any flares.      Dad  of esophageal cancer at age 61. Former smoker. Lots of reflux.   Plan for Repeat colonoscopy in ; will plan to do EGD at this time as well due to family hx of esophageal cancer     Wt Readings from Last 4 Encounters:   25 105.4 kg (232 lb 7.6 oz)   24 106.4 kg (234 lb 7.4 oz)   24 107.9 kg (237 lb 14 oz)   10/04/23 104.3 kg (230 lb)       Review of Systems   Constitutional:  Negative for activity change and unexpected weight change.   HENT:  Negative for hearing loss, rhinorrhea and trouble swallowing.    Eyes:  Negative for discharge and visual disturbance.   Respiratory:  Negative for chest tightness and wheezing.    Cardiovascular:  Negative for chest pain and palpitations.   Gastrointestinal:  Negative for blood in stool, constipation, diarrhea and vomiting.   Endocrine: Negative for polydipsia and polyuria.   Genitourinary:  Negative for difficulty urinating, hematuria and urgency.   Musculoskeletal:  Negative for arthralgias, joint swelling and neck pain.   Neurological:  Negative for weakness and headaches.   Psychiatric/Behavioral:  Negative for confusion and dysphoric mood.        Objective:   /80 (BP Location: Left arm, Patient Position: Sitting)   Pulse 72   Ht 6' 2" (1.88 m)   Wt 105.4 kg (232 lb 7.6 oz) "   SpO2 97%   BMI 29.85 kg/m²      Physical Exam  Constitutional:       General: He is not in acute distress.     Appearance: He is well-developed. He is not diaphoretic.   HENT:      Head: Normocephalic and atraumatic.   Cardiovascular:      Rate and Rhythm: Normal rate and regular rhythm.      Heart sounds: No murmur heard.  Pulmonary:      Effort: Pulmonary effort is normal. No respiratory distress.      Breath sounds: No wheezing or rales.   Skin:     General: Skin is warm and dry.   Neurological:      Mental Status: He is alert and oriented to person, place, and time.   Psychiatric:         Behavior: Behavior normal.         Assessment:       1. Health care maintenance    2. HARDIN (nonalcoholic steatohepatitis)    3. Abnormal liver enzymes    4. Sleep apnea, unspecified type    5. Acute gout of multiple sites, unspecified cause    6. Erectile dysfunction, unspecified erectile dysfunction type        Plan:       Health care maintenance  -     CBC Auto Differential; Future; Expected date: 04/01/2025  -     Hemoglobin A1C; Future; Expected date: 04/01/2025  -     Comprehensive Metabolic Panel; Future; Expected date: 04/01/2025  -     Lipid Panel; Future; Expected date: 04/01/2025  -     TSH; Future; Expected date: 04/01/2025  Reminded to schedule annual TBSE with dermatologist    HARDIN (nonalcoholic steatohepatitis)  Abnormal liver enzymes  Check labs as above    Sleep apnea, unspecified type  Reports he is doing well with oral appliance and will have testing done soon. Have asked that he send us a copy    Hx of Acute gout of multiple sites, unspecified cause  -     no recent flares  Not on allopurinol  Watches diet  Request Uric Acid; Future; Expected date: 04/01/2025    Erectile dysfunction, unspecified erectile dysfunction type  -     Testosterone, Free; Future; Expected date: 04/01/2025  -     Testosterone; Future; Expected date: 04/01/2025          You are up to date for your primary preventive health care,  and there are no reminders at this time.

## (undated) DEVICE — GAUZE SPONGE 4X4 12PLY

## (undated) DEVICE — TOURNIQUET SB QC DP 18X4IN

## (undated) DEVICE — BANDAGE MATRIX HK LOOP 4IN 5YD

## (undated) DEVICE — GLOVE BIOGEL PI MICRO INDIC 7

## (undated) DEVICE — Device

## (undated) DEVICE — COVER CAMERA OPERATING ROOM

## (undated) DEVICE — GLOVE BIOGEL SKINSENSE PI 7.0

## (undated) DEVICE — DRAPE STERI-DRAPE 1000 17X11IN

## (undated) DEVICE — DRESSING N ADH OIL EMUL 3X3

## (undated) DEVICE — GLOVE BIOGEL ECLIPSE SZ 7

## (undated) DEVICE — SOL IRR SOD CHL .9% POUR

## (undated) DEVICE — SCRUB 10% POVIDONE IODINE 4OZ

## (undated) DEVICE — SLING ARM X-LARGE FOAM STRAP

## (undated) DEVICE — SUT 4/0 18IN ETHILON BL P3

## (undated) DEVICE — BANDAGE MATRIX HK LOOP 2IN 5YD

## (undated) DEVICE — ADHESIVE DERMABOND ADVANCED

## (undated) DEVICE — SWAB CULTURETTE II DUAL

## (undated) DEVICE — CORD FOR BIPOLAR FORCEPS 12